# Patient Record
Sex: FEMALE | Race: WHITE | NOT HISPANIC OR LATINO | Employment: UNEMPLOYED | URBAN - METROPOLITAN AREA
[De-identification: names, ages, dates, MRNs, and addresses within clinical notes are randomized per-mention and may not be internally consistent; named-entity substitution may affect disease eponyms.]

---

## 2017-03-16 ENCOUNTER — OFFICE VISIT (OUTPATIENT)
Dept: OPTOMETRY | Facility: CLINIC | Age: 62
End: 2017-03-16
Payer: COMMERCIAL

## 2017-03-16 DIAGNOSIS — H04.123 DRY EYE SYNDROME, BILATERAL: ICD-10-CM

## 2017-03-16 DIAGNOSIS — H52.7 REFRACTIVE ERROR: ICD-10-CM

## 2017-03-16 DIAGNOSIS — R51.9 HEADACHE AROUND THE EYES: Primary | ICD-10-CM

## 2017-03-16 PROCEDURE — 92012 INTRM OPH EXAM EST PATIENT: CPT | Mod: S$GLB,,, | Performed by: OPTOMETRIST

## 2017-03-16 PROCEDURE — 99999 PR PBB SHADOW E&M-EST. PATIENT-LVL I: CPT | Mod: PBBFAC,,, | Performed by: OPTOMETRIST

## 2017-03-16 NOTE — PROGRESS NOTES
HPI     CC: Pt here for blurred vision in right eye over the last 2 weeks. Pt is   getting eye strain from blurred vision and causing headaches. Pt states 2   weeks right eye was irritated and has been blurry since. Pt last wore   contacts 2 weeks ago. Pt does not sleeps in lenses. Pt denies crust or   tears in right eye. Pt states when she blink right eye does feel dry.    (+) OTC art tears using 1 x day both eyes     (-) pain / (+) discomfort  (+) headache behind eye  (-) diplopia   (-) flashes / (-) floaters         Last edited by Leonardo Stephen, OD on 3/16/2017  9:56 AM.     ROS     Positive for: Eyes    Negative for: Constitutional, Gastrointestinal, Neurological, Skin,   Genitourinary, Musculoskeletal, HENT, Endocrine, Cardiovascular,   Respiratory, Psychiatric, Allergic/Imm, Heme/Lymph    Last edited by Leonardo Stephen, OD on 3/16/2017  9:18 AM. (History)        Assessment /Plan     For exam results, see Encounter Report.    Headache around the eyes    Refractive error    Dry eye syndrome, bilateral      HA around eye, pt reports feels better when lying down. Not taking any meds. Pt did not update specs from last visit. Demonstrated updated Rx vs current specs in phoropter with pt satisfaction. Recommend updating new specs and otc sudafed as directed for possible sinus congestion. If any worsening of symptoms or no improvement, return for f/u.     Discussed daily disposable contacts in future. Pt only wears once every 2 weeks.     JOE OU. Recommend otc systane gel drop bid ou. Transient blurring of vision with gel use. Return if any worsening of symptoms or no alleviation with otc drops.    RTC as scheduled for comprehensive eye exam, or sooner prn.

## 2017-07-10 ENCOUNTER — HOSPITAL ENCOUNTER (OUTPATIENT)
Dept: RADIOLOGY | Facility: HOSPITAL | Age: 62
Discharge: HOME | End: 2017-07-10
Attending: ORTHOPAEDIC SURGERY
Payer: COMMERCIAL

## 2017-07-10 ENCOUNTER — OFFICE VISIT (OUTPATIENT)
Dept: ORTHOPEDICS | Facility: CLINIC | Age: 62
End: 2017-07-10
Payer: COMMERCIAL

## 2017-07-10 VITALS
HEART RATE: 68 BPM | WEIGHT: 145 LBS | HEIGHT: 62 IN | SYSTOLIC BLOOD PRESSURE: 162 MMHG | DIASTOLIC BLOOD PRESSURE: 85 MMHG | BODY MASS INDEX: 26.68 KG/M2

## 2017-07-10 DIAGNOSIS — S83.241A ACUTE MEDIAL MENISCAL TEAR, RIGHT, INITIAL ENCOUNTER: Primary | ICD-10-CM

## 2017-07-10 DIAGNOSIS — M25.569 KNEE PAIN, UNSPECIFIED CHRONICITY, UNSPECIFIED LATERALITY: Primary | ICD-10-CM

## 2017-07-10 DIAGNOSIS — M25.569 KNEE PAIN, UNSPECIFIED CHRONICITY, UNSPECIFIED LATERALITY: ICD-10-CM

## 2017-07-10 PROCEDURE — 73564 X-RAY EXAM KNEE 4 OR MORE: CPT | Mod: TC,PN,RT

## 2017-07-10 PROCEDURE — 20610 DRAIN/INJ JOINT/BURSA W/O US: CPT | Mod: RT,S$GLB,, | Performed by: ORTHOPAEDIC SURGERY

## 2017-07-10 PROCEDURE — 99999 PR PBB SHADOW E&M-EST. PATIENT-LVL III: CPT | Mod: PBBFAC,,, | Performed by: ORTHOPAEDIC SURGERY

## 2017-07-10 PROCEDURE — 73564 X-RAY EXAM KNEE 4 OR MORE: CPT | Mod: 26,RT,, | Performed by: RADIOLOGY

## 2017-07-10 PROCEDURE — 73562 X-RAY EXAM OF KNEE 3: CPT | Mod: 26,59,LT, | Performed by: RADIOLOGY

## 2017-07-10 PROCEDURE — 99203 OFFICE O/P NEW LOW 30 MIN: CPT | Mod: 25,S$GLB,, | Performed by: ORTHOPAEDIC SURGERY

## 2017-07-10 RX ORDER — TRIAMCINOLONE ACETONIDE 40 MG/ML
40 INJECTION, SUSPENSION INTRA-ARTICULAR; INTRAMUSCULAR
Status: DISCONTINUED | OUTPATIENT
Start: 2017-07-10 | End: 2017-07-10 | Stop reason: HOSPADM

## 2017-07-10 RX ADMIN — TRIAMCINOLONE ACETONIDE 40 MG: 40 INJECTION, SUSPENSION INTRA-ARTICULAR; INTRAMUSCULAR at 04:07

## 2017-07-10 NOTE — PROCEDURES
Large Joint Aspiration/Injection  Date/Time: 7/10/2017 4:00 PM  Performed by: YULISSA DODGE  Authorized by: YULISSA DODGE     Consent Done?:  Yes (Verbal)  Indications:  Pain  Procedure site marked: Yes    Timeout: Prior to procedure the correct patient, procedure, and site was verified      Location:  Knee  Site:  R knee  Prep: Patient was prepped and draped in usual sterile fashion    Needle size:  20 G  Approach:  Anterolateral  Medications:  40 mg triamcinolone acetonide 40 mg/mL  Patient tolerance:  Patient tolerated the procedure well with no immediate complications

## 2017-07-10 NOTE — PROGRESS NOTES
History reviewed. No pertinent past medical history.    History reviewed. No pertinent surgical history.    No current outpatient prescriptions on file.     No current facility-administered medications for this visit.        Review of patient's allergies indicates:  No Known Allergies    Family History   Problem Relation Age of Onset    Glaucoma Neg Hx     Macular degeneration Neg Hx     Retinal detachment Neg Hx        Social History     Social History    Marital status:      Spouse name: N/A    Number of children: N/A    Years of education: N/A     Occupational History    Not on file.     Social History Main Topics    Smoking status: Current Some Day Smoker    Smokeless tobacco: Never Used    Alcohol use Yes    Drug use: No    Sexual activity: Not on file     Other Topics Concern    Not on file     Social History Narrative    No narrative on file       Chief Complaint:   Chief Complaint   Patient presents with    Knee Pain     right knee pain       History of present illness: 61-year-old female seen for right knee pain.  Patient injured her knee on July 6, 2017.  She thinks that she hurt it getting up off the floor with her grandchild.  Patient has pain when bending and twisting.  Pain is medially located.  Symptoms are worsening and moderate to severe.  Pain as a 6 out of 10 but can be up to a 10 out of 10 at times.      Review of Systems:    Constitution: Negative for chills, fever, and sweats.  Negative for unexplained weight loss.    HENT:  Negative for headaches and blurry vision.    Cardiovascular:Negative for chest pain or irregular heart beat. Negative for hypertension.    Respiratory:  Negative for cough and shortness of breath.    Gastrointestinal: Negative for abdominal pain, heartburn, melena, nausea, and vomitting.    Genitourinary:  Negative bladder incontinence and dysuria.    Musculoskeletal:  See HPI    Neurological: Negative for numbness.    Psychiatric/Behavioral: Negative  for depression.  The patient is not nervous/anxious.      Endocrine: Negative for polyuria    Hematologic/Lymphatic: Negative for bleeding problem.  Does not bruise/bleed easily.    Skin: Negative for poor would healing and rash      Physical Examination:    Vital Signs:    Vitals:    07/10/17 1351   BP: (!) 162/85   Pulse: 68       Body mass index is 26.52 kg/m².    This a well-developed, well nourished patient in no acute distress.  They are alert and oriented and cooperative to examination.  Pt. walks without an antalgic gait.      Examination of the right knee shows no rashes or erythema. There are no masses ecchymosis or effusion. Patient has full range of motion from 0-130°. Patient is nontender to palpation over lateral joint line and markedly tender to palpation over the medial joint line. Patient has a - Lachman exam, - anterior drawer exam, and - posterior drawer exam.  Positive medial Apley exam. Knee is stable to varus and valgus stress. 5 out of 5 motor strength. Palpable distal pulses. Intact light touch sensation. Negative Patellofemoral crepitus    Examination of the left knee shows no rashes or erythema. There are no masses ecchymosis or effusion. Patient has full range of motion from 0-130°. Patient is nontender to palpation over lateral joint line and nontender to palpation over the medial joint line. Patient has a - Lachman exam, - anterior drawer exam, and - posterior drawer exam. - Marc's exam. Knee is stable to varus and valgus stress. 5 out of 5 motor strength. Palpable distal pulses. Intact light touch sensation. Negative Patellofemoral crepitus    X-rays: X-rays of the right knee are ordered and reviewed which show mild joint effusion.  Very mild medial narrowing bilaterally     Assessment:: Right medial meniscal tear with some mild underlying arthritis    Plan:  I reviewed the diagnosis and the x-rays with her today.  We talked about meniscal tears.  Patient elected to try cortisone  injection.  We talked about possibly getting an MRI if the pain does not improve with the injection and anti-inflammatories.    This note was created using Dragon voice recognition software that occasionally misinterpreted phrases or words.    Consult note is delivered via Epic messaging service.

## 2018-02-27 ENCOUNTER — OFFICE VISIT (OUTPATIENT)
Dept: FAMILY MEDICINE | Facility: CLINIC | Age: 63
End: 2018-02-27
Payer: COMMERCIAL

## 2018-02-27 VITALS
SYSTOLIC BLOOD PRESSURE: 132 MMHG | HEIGHT: 62 IN | DIASTOLIC BLOOD PRESSURE: 72 MMHG | BODY MASS INDEX: 25.6 KG/M2 | OXYGEN SATURATION: 98 % | HEART RATE: 81 BPM | WEIGHT: 139.13 LBS

## 2018-02-27 DIAGNOSIS — Z00.00 WELLNESS EXAMINATION: ICD-10-CM

## 2018-02-27 DIAGNOSIS — Z72.0 TOBACCO USE: ICD-10-CM

## 2018-02-27 DIAGNOSIS — Z76.89 ESTABLISHING CARE WITH NEW DOCTOR, ENCOUNTER FOR: Primary | ICD-10-CM

## 2018-02-27 DIAGNOSIS — Z11.59 ENCOUNTER FOR HEPATITIS C SCREENING TEST FOR LOW RISK PATIENT: ICD-10-CM

## 2018-02-27 DIAGNOSIS — Z12.11 COLON CANCER SCREENING: ICD-10-CM

## 2018-02-27 DIAGNOSIS — Z12.39 ENCOUNTER FOR SCREENING FOR MALIGNANT NEOPLASM OF BREAST: ICD-10-CM

## 2018-02-27 PROCEDURE — 99999 PR PBB SHADOW E&M-EST. PATIENT-LVL III: CPT | Mod: PBBFAC,,, | Performed by: FAMILY MEDICINE

## 2018-02-27 PROCEDURE — 99386 PREV VISIT NEW AGE 40-64: CPT | Mod: S$GLB,,, | Performed by: FAMILY MEDICINE

## 2018-02-27 NOTE — PROGRESS NOTES
Subjective:       Patient ID: Laura Patino is a 62 y.o. female.    Chief Complaint: Annual Exam    HPI     Annual Exam  Pt reports to the clinic for a wellness exam.   Currently, pt is without complaint.   The pt has a medical history which includes tobacco use.   As far as smoking is concerned, the pt reports that she smokes.   The pt attempts to maintain a healthy diet and pt reports that she is active but she does not routinely engage in regular exercise at this point but plans to soon.  Consistent seatbelt usage reported.   Pt has no symptoms of depression.     Review of Systems   Constitutional: Negative for chills and fever.   HENT: Negative for sore throat.    Eyes: Negative for visual disturbance.   Respiratory: Negative for cough and shortness of breath.    Cardiovascular: Negative for chest pain and leg swelling.   Gastrointestinal: Negative for abdominal pain, blood in stool, constipation, diarrhea and vomiting.   Genitourinary: Negative for difficulty urinating, dysuria and hematuria.   Musculoskeletal: Negative for arthralgias and back pain.   Neurological: Negative for weakness and headaches.       Objective:      Physical Exam   Constitutional: She appears well-developed and well-nourished. No distress.   HENT:   Head: Normocephalic and atraumatic.   Mouth/Throat: Oropharynx is clear and moist. No oropharyngeal exudate.   Eyes: EOM are normal. Pupils are equal, round, and reactive to light.   Neck: Normal range of motion. Neck supple. No thyromegaly present.   Cardiovascular: Normal rate, regular rhythm, normal heart sounds and intact distal pulses.    Pulmonary/Chest: Effort normal and breath sounds normal. No respiratory distress. She has no wheezes.   Abdominal: Soft. Bowel sounds are normal. She exhibits no distension and no mass. There is no tenderness.   Musculoskeletal: She exhibits no edema.   Lymphadenopathy:     She has no cervical adenopathy.   Neurological: She is alert.   Skin: Skin  is warm. No rash noted. No erythema.   Psychiatric: She has a normal mood and affect. Her behavior is normal.   Vitals reviewed.      Assessment:       1. Establishing care with new doctor, encounter for    2. Wellness examination    3. Tobacco use    4. Encounter for screening for malignant neoplasm of breast    5. Encounter for hepatitis C screening test for low risk patient        Plan:       1. Establishing care with new doctor, encounter for    2. Wellness examination  Patient has been advised to continue to maintain a healthy lifestyle, including regular exercise and consuming a well balanced diet.   - Lipid panel; Future  - Comprehensive metabolic panel; Future  - Microalbumin/creatinine urine ratio  - TSH    3. Tobacco use  - Ambulatory referral to Smoking Cessation Program    4. Encounter for screening for malignant neoplasm of breast  - Mammo Digital Screening Bilat with CAD; Future    5. Encounter for hepatitis C screening test for low risk patient  - Hepatitis C antibody; Future    6. Colon cancer screening  - Ambulatory referral to Gastroenterology    Portions of this note were created using Dragon voice recognition software. There may be voice recognition errors found in the text, and attempts were made to correct these errors prior to signature    Ruddy Morales MD    Family Medicine  2/27/2018

## 2018-02-28 ENCOUNTER — LAB VISIT (OUTPATIENT)
Dept: LAB | Facility: HOSPITAL | Age: 63
End: 2018-02-28
Attending: FAMILY MEDICINE
Payer: COMMERCIAL

## 2018-02-28 ENCOUNTER — HOSPITAL ENCOUNTER (OUTPATIENT)
Dept: RADIOLOGY | Facility: CLINIC | Age: 63
Discharge: HOME | End: 2018-02-28
Attending: FAMILY MEDICINE
Payer: COMMERCIAL

## 2018-02-28 DIAGNOSIS — Z00.00 WELLNESS EXAMINATION: ICD-10-CM

## 2018-02-28 DIAGNOSIS — Z11.59 ENCOUNTER FOR HEPATITIS C SCREENING TEST FOR LOW RISK PATIENT: ICD-10-CM

## 2018-02-28 DIAGNOSIS — Z12.39 ENCOUNTER FOR SCREENING FOR MALIGNANT NEOPLASM OF BREAST: ICD-10-CM

## 2018-02-28 LAB
ALBUMIN SERPL BCP-MCNC: 3.6 G/DL
ALP SERPL-CCNC: 69 U/L
ALT SERPL W/O P-5'-P-CCNC: 16 U/L
ANION GAP SERPL CALC-SCNC: 9 MMOL/L
AST SERPL-CCNC: 17 U/L
BILIRUB SERPL-MCNC: 0.3 MG/DL
BUN SERPL-MCNC: 16 MG/DL
CALCIUM SERPL-MCNC: 9.6 MG/DL
CHLORIDE SERPL-SCNC: 105 MMOL/L
CHOLEST SERPL-MCNC: 190 MG/DL
CHOLEST/HDLC SERPL: 3.3 {RATIO}
CO2 SERPL-SCNC: 27 MMOL/L
CREAT SERPL-MCNC: 0.7 MG/DL
EST. GFR  (AFRICAN AMERICAN): >60 ML/MIN/1.73 M^2
EST. GFR  (NON AFRICAN AMERICAN): >60 ML/MIN/1.73 M^2
GLUCOSE SERPL-MCNC: 89 MG/DL
HDLC SERPL-MCNC: 58 MG/DL
HDLC SERPL: 30.5 %
LDLC SERPL CALC-MCNC: 116.8 MG/DL
NONHDLC SERPL-MCNC: 132 MG/DL
POTASSIUM SERPL-SCNC: 4.2 MMOL/L
PROT SERPL-MCNC: 7 G/DL
SODIUM SERPL-SCNC: 141 MMOL/L
TRIGL SERPL-MCNC: 76 MG/DL
TSH SERPL DL<=0.005 MIU/L-ACNC: 0.85 UIU/ML

## 2018-02-28 PROCEDURE — 77067 SCR MAMMO BI INCL CAD: CPT | Mod: 26,,, | Performed by: RADIOLOGY

## 2018-02-28 PROCEDURE — 77063 BREAST TOMOSYNTHESIS BI: CPT | Mod: 26,,, | Performed by: RADIOLOGY

## 2018-02-28 PROCEDURE — 80053 COMPREHEN METABOLIC PANEL: CPT

## 2018-02-28 PROCEDURE — 84443 ASSAY THYROID STIM HORMONE: CPT

## 2018-02-28 PROCEDURE — 36415 COLL VENOUS BLD VENIPUNCTURE: CPT | Mod: PO

## 2018-02-28 PROCEDURE — 80061 LIPID PANEL: CPT

## 2018-02-28 PROCEDURE — 86803 HEPATITIS C AB TEST: CPT

## 2018-02-28 PROCEDURE — 77067 SCR MAMMO BI INCL CAD: CPT | Mod: TC,PO

## 2018-03-01 DIAGNOSIS — Z12.11 COLON CANCER SCREENING: ICD-10-CM

## 2018-03-01 LAB — HCV AB SERPL QL IA: NEGATIVE

## 2018-04-18 ENCOUNTER — OFFICE VISIT (OUTPATIENT)
Dept: OPTOMETRY | Facility: CLINIC | Age: 63
End: 2018-04-18
Payer: COMMERCIAL

## 2018-04-18 DIAGNOSIS — H52.7 REFRACTIVE ERROR: ICD-10-CM

## 2018-04-18 DIAGNOSIS — Z46.0 CONTACT LENS/GLASSES FITTING: Primary | ICD-10-CM

## 2018-04-18 DIAGNOSIS — H25.13 NUCLEAR SCLEROSIS, BILATERAL: Primary | ICD-10-CM

## 2018-04-18 PROCEDURE — 99999 PR PBB SHADOW E&M-EST. PATIENT-LVL I: CPT | Mod: PBBFAC,,, | Performed by: OPTOMETRIST

## 2018-04-18 PROCEDURE — 99499 UNLISTED E&M SERVICE: CPT | Mod: ,,, | Performed by: OPTOMETRIST

## 2018-04-18 PROCEDURE — 92014 COMPRE OPH EXAM EST PT 1/>: CPT | Mod: S$GLB,,, | Performed by: OPTOMETRIST

## 2018-04-18 PROCEDURE — 99999 PR PBB SHADOW E&M-EST. PATIENT-LVL II: CPT | Mod: PBBFAC,,, | Performed by: OPTOMETRIST

## 2018-04-18 PROCEDURE — 92310 CONTACT LENS FITTING OU: CPT | Mod: ,,, | Performed by: OPTOMETRIST

## 2018-04-18 PROCEDURE — 92015 DETERMINE REFRACTIVE STATE: CPT | Mod: S$GLB,,, | Performed by: OPTOMETRIST

## 2018-04-18 NOTE — PROGRESS NOTES
Assessment /Plan     For exam results, see Encounter Report.    Contact lens/glasses fitting      Patient is here for a comprehensive eye exam and contact lens fit. See other exam visit with same encounter date 04/18/18 for detailed exam information.

## 2018-04-18 NOTE — PROGRESS NOTES
HPI     Presenting Complaint: Pt here today for yearly ocular health check and   contact lens fitting.    CL Brand:   Right [employer] Ultra 8.5 14.2 +1.75 Sphere Daily Wear  Left B[employer]ch [employer] Lomb Ultra 8.5 14.2 +4.50 Sphere Daily Wear    Sleeps in lenses: Never  Changes lenses:  Every 2 weeks  Solution: Multipurpose     Pt states vision was good but contacts do see to cause irration in both   eyes, Pt states eyes have been turing red once contact lens are in eye    Ophthalmic medication / drops: None    (-) headaches  (-) diplopia   (-) flashes / (-) floaters      Last edited by Leonardo Stephen, OD on 4/18/2018 10:11 AM. (History)            Assessment /Plan     For exam results, see Encounter Report.    Nuclear sclerosis, bilateral    Refractive error      Mild NS OU. Discussed possible ocular affects of cataracts. Acceptable BCVA OU. Discussed treatment options. Surgery not recommended at this time. Monitor yearly.     Dispensed updated spectacle Rx. Discussed various spectacle lens options. Discussed adaptation period to new specs.     Discussed CLs options. Switched to daily disposable for occasional wear. Dispensed CLs trials for monovision (OD distance  OS near): BioTrue. Discussed proper wear and care of lenses. DW only, dispose of daily. Do not sleep/swim/shower in lenses. Discontinue CL wear ASAP and RTC if any redness or discomfort occurs. Return in 1 week for CLFU.      RTC in 1 week for CLFU, or sooner prn.

## 2018-04-25 ENCOUNTER — OFFICE VISIT (OUTPATIENT)
Dept: OPTOMETRY | Facility: CLINIC | Age: 63
End: 2018-04-25
Payer: COMMERCIAL

## 2018-04-25 DIAGNOSIS — Z46.0 CONTACT LENS/GLASSES FITTING: Primary | ICD-10-CM

## 2018-04-25 PROCEDURE — 99499 UNLISTED E&M SERVICE: CPT | Mod: S$GLB,,, | Performed by: OPTOMETRIST

## 2018-04-25 NOTE — PROGRESS NOTES
HPI     Presenting Complaint: Pt here today for contact lens follow up. Pt happy   with comfort of daily lens. Pt sates mono vision is going well. Still   getting used to driving with them at night. Pt states near vision has been   great.         Last edited by Leonardo Stephen, OD on 4/25/2018  9:56 AM. (History)            Assessment /Plan     For exam results, see Encounter Report.    Contact lens/glasses fitting      Good CLFU. Pt happy with comfort. Pt overall happy with vision. No irritation, happier with lenses than previous. Dispensed CLs Rx: BioTrue OneDay. Discussed proper wear and care of lenses. DW only, dispose of daily. Do not sleep/swim/shower in lenses. Discontinue CL wear ASAP and RTC if any redness or discomfort occurs.

## 2019-06-21 ENCOUNTER — PATIENT OUTREACH (OUTPATIENT)
Dept: ADMINISTRATIVE | Facility: HOSPITAL | Age: 64
End: 2019-06-21

## 2019-06-21 NOTE — PROGRESS NOTES
Health Maintenance Due   Topic Date Due    TETANUS VACCINE  10/17/1975    Pneumococcal Vaccine (Medium Risk) (1 of 1 - PPSV23) 10/17/1976    Colonoscopy  10/17/2007

## 2019-06-21 NOTE — LETTER
June 21, 2019    Laura Patino  [address]  [place] [address]             [facility]  [address]  [facility] [address]  Phone: [phone] Dear Laura Patino,    [facility] is committed to your overall health.  To help you get the most out of each of your visits, we will review your information to make sure you are up to date on all of your recommended tests and/or procedures.      As a new patient to Dr.Amy Caesar MD , we may not have your complete medical records. She has found that your chart shows you may be due for a:    COLORECTAL CANCER SCREENING      If you have had any of the above done at another facility, please bring the records or information with you so that your record at [facility] will be complete.      If you are currently taking medication, please bring it with you to your appointment for review.    Also, if you have any type of Advanced Directives, please bring them with you to your office visit so we may scan them into your chart.      Thank You,  Your [facility] Team  Dr Tanika Brady LPN Clinical Care Coordinator  [facility] [address] [place] [address]  Phone [phone]  Fax [phone]

## 2019-07-05 ENCOUNTER — OFFICE VISIT (OUTPATIENT)
Dept: FAMILY MEDICINE | Facility: CLINIC | Age: 64
End: 2019-07-05
Payer: COMMERCIAL

## 2019-07-05 VITALS
TEMPERATURE: 98 F | HEART RATE: 68 BPM | SYSTOLIC BLOOD PRESSURE: 120 MMHG | RESPIRATION RATE: 12 BRPM | DIASTOLIC BLOOD PRESSURE: 70 MMHG | HEIGHT: 62 IN | BODY MASS INDEX: 26.57 KG/M2 | OXYGEN SATURATION: 98 % | WEIGHT: 144.38 LBS

## 2019-07-05 DIAGNOSIS — Z13.220 LIPID SCREENING: ICD-10-CM

## 2019-07-05 DIAGNOSIS — Z13.29 THYROID DISORDER SCREENING: ICD-10-CM

## 2019-07-05 DIAGNOSIS — Z12.31 ENCOUNTER FOR SCREENING MAMMOGRAM FOR MALIGNANT NEOPLASM OF BREAST: ICD-10-CM

## 2019-07-05 DIAGNOSIS — F41.8 DEPRESSION WITH ANXIETY: ICD-10-CM

## 2019-07-05 DIAGNOSIS — Z23 NEED FOR SHINGLES VACCINE: ICD-10-CM

## 2019-07-05 DIAGNOSIS — Z72.0 TOBACCO USE: ICD-10-CM

## 2019-07-05 DIAGNOSIS — Z23 NEED FOR TDAP VACCINATION: ICD-10-CM

## 2019-07-05 DIAGNOSIS — Z00.00 ANNUAL PHYSICAL EXAM: Primary | ICD-10-CM

## 2019-07-05 DIAGNOSIS — Z23 NEED FOR 23-POLYVALENT PNEUMOCOCCAL POLYSACCHARIDE VACCINE: ICD-10-CM

## 2019-07-05 PROCEDURE — 99999 PR PBB SHADOW E&M-EST. PATIENT-LVL IV: CPT | Mod: PBBFAC,,, | Performed by: FAMILY MEDICINE

## 2019-07-05 PROCEDURE — 90715 TDAP VACCINE GREATER THAN OR EQUAL TO 7YO IM: ICD-10-PCS | Mod: S$GLB,,, | Performed by: FAMILY MEDICINE

## 2019-07-05 PROCEDURE — 90750 HZV VACC RECOMBINANT IM: CPT | Mod: S$GLB,,, | Performed by: FAMILY MEDICINE

## 2019-07-05 PROCEDURE — 90471 TDAP VACCINE GREATER THAN OR EQUAL TO 7YO IM: ICD-10-PCS | Mod: S$GLB,,, | Performed by: FAMILY MEDICINE

## 2019-07-05 PROCEDURE — 99396 PR PREVENTIVE VISIT,EST,40-64: ICD-10-PCS | Mod: S$GLB,,, | Performed by: FAMILY MEDICINE

## 2019-07-05 PROCEDURE — 99396 PREV VISIT EST AGE 40-64: CPT | Mod: S$GLB,,, | Performed by: FAMILY MEDICINE

## 2019-07-05 PROCEDURE — 90472 ZOSTER RECOMBINANT VACCINE: ICD-10-PCS | Mod: S$GLB,,, | Performed by: FAMILY MEDICINE

## 2019-07-05 PROCEDURE — 90471 IMMUNIZATION ADMIN: CPT | Mod: S$GLB,,, | Performed by: FAMILY MEDICINE

## 2019-07-05 PROCEDURE — 90715 TDAP VACCINE 7 YRS/> IM: CPT | Mod: S$GLB,,, | Performed by: FAMILY MEDICINE

## 2019-07-05 PROCEDURE — 90732 PNEUMOCOCCAL POLYSACCHARIDE VACCINE 23-VALENT =>2YO SQ IM: ICD-10-PCS | Mod: S$GLB,,, | Performed by: FAMILY MEDICINE

## 2019-07-05 PROCEDURE — 90472 IMMUNIZATION ADMIN EACH ADD: CPT | Mod: S$GLB,,, | Performed by: FAMILY MEDICINE

## 2019-07-05 PROCEDURE — 90750 ZOSTER RECOMBINANT VACCINE: ICD-10-PCS | Mod: S$GLB,,, | Performed by: FAMILY MEDICINE

## 2019-07-05 PROCEDURE — 90732 PPSV23 VACC 2 YRS+ SUBQ/IM: CPT | Mod: S$GLB,,, | Performed by: FAMILY MEDICINE

## 2019-07-05 PROCEDURE — 99999 PR PBB SHADOW E&M-EST. PATIENT-LVL IV: ICD-10-PCS | Mod: PBBFAC,,, | Performed by: FAMILY MEDICINE

## 2019-07-05 RX ORDER — CHOLECALCIFEROL (VITAMIN D3) 25 MCG
1000 TABLET ORAL DAILY
COMMUNITY
End: 2019-10-28

## 2019-07-05 RX ORDER — ESCITALOPRAM OXALATE 10 MG/1
10 TABLET ORAL DAILY
Qty: 30 TABLET | Refills: 5 | Status: SHIPPED | OUTPATIENT
Start: 2019-07-05 | End: 2019-10-28

## 2019-07-05 RX ORDER — PNV NO.95/FERROUS FUM/FOLIC AC 28MG-0.8MG
100 TABLET ORAL DAILY
COMMUNITY

## 2019-07-05 NOTE — PROGRESS NOTES
Subjective:       Patient ID: Laura Patino is a 63 y.o. female.    Chief Complaint: Establish Care    HPI  Review of Systems   Constitutional: Negative for activity change and unexpected weight change.   HENT: Negative for hearing loss, rhinorrhea and trouble swallowing.    Eyes: Negative for discharge and visual disturbance.   Respiratory: Negative for chest tightness and wheezing.    Cardiovascular: Negative for chest pain and palpitations.   Gastrointestinal: Negative for blood in stool, constipation, diarrhea and vomiting.   Endocrine: Negative for polydipsia and polyuria.   Genitourinary: Negative for difficulty urinating, dysuria, hematuria and menstrual problem.   Musculoskeletal: Negative for arthralgias, joint swelling and neck pain.   Neurological: Negative for weakness and headaches.   Psychiatric/Behavioral: Positive for dysphoric mood and sleep disturbance. Negative for confusion. The patient is nervous/anxious.        Patient Active Problem List   Diagnosis    Tobacco use     Patient is here .    Will consider colonoscopy versus FIT  Objective:      Physical Exam   Constitutional: She is oriented to person, place, and time. She appears well-developed and well-nourished.   Cardiovascular: Normal rate, regular rhythm and normal heart sounds.   Pulmonary/Chest: Effort normal and breath sounds normal.   Musculoskeletal: She exhibits no edema.   Neurological: She is alert and oriented to person, place, and time.   Skin: Skin is warm and dry.   Psychiatric: Her speech is normal and behavior is normal. Her mood appears anxious. She exhibits a depressed mood.   Nursing note and vitals reviewed.      Assessment:       1. Annual physical exam    2. Lipid screening    3. Thyroid disorder screening    4. Tobacco use    5. Need for Tdap vaccination    6. Need for shingles vaccine    7. Need for 23-polyvalent pneumococcal polysaccharide vaccine    8. Encounter for screening mammogram for malignant neoplasm of  breast    9. Depression with anxiety        Plan:         1. Annual physical exam  Discussed health maintenance guidelines appropriate for age.      - CBC auto differential; Future  - Comprehensive metabolic panel; Future    2. Lipid screening  Screen and treat as indicated:    - Lipid panel; Future    3. Thyroid disorder screening  Screen and treat as indicated:    - TSH; Future    4. Tobacco use  Patient counseled on smoking cessation. I discussed options such as nicotine replacement products, wellbutrin, and chantix.  Side effects, benefits and risks were discussed regarding each.  Printed materials were given.  I offered a referral to [facility] smoking cessation program.  All questions were answered.      5. Need for Tdap vaccination  Immunize today.  Counseled patient on risks, benefits and side effects.  Patient elected to proceed with vaccination.    - (In Office Administered) Tdap Vaccine    6. Need for shingles vaccine  Immunize today.  Counseled patient on risks, benefits and side effects.  Patient elected to proceed with vaccination.    - (In Office Administered) Zoster Recombinant Vaccine  - (In Office Administered) Zoster Recombinant Vaccine; Future    7. Need for 23-polyvalent pneumococcal polysaccharide vaccine  Immunize today.  Counseled patient on risks, benefits and side effects.  Patient elected to proceed with vaccination.    - (In Office Administered) Pneumococcal Polysaccharide Vaccine (23 Valent) (SQ/IM)    8. Encounter for screening mammogram for malignant neoplasm of breast  Screen and treat as indicated:      9. Depression with anxiety  Treat  - escitalopram oxalate (LEXAPRO) 10 MG tablet; Take 1 tablet (10 mg total) by mouth once daily.  Dispense: 30 tablet; Refill: 5        Time spent with patient: 20 minutes    Patient with be reevaluated in 5 weeks and 6 months or sooner prn    Greater than 50% of this visit was spent counseling as described in above documentation:Yes

## 2019-07-05 NOTE — PROGRESS NOTES
Patient verified by name and . Patient received tdap in left deltoid, pneumococcal 23 in right deltoid and Shingrix 1 of 2 in right ventrogluteal. Patient tolerated injections well. Patient advised to wait in clinic for 15 minutes in case of adverse reactions. Patient demonstrated understanding.

## 2019-07-07 ENCOUNTER — PATIENT MESSAGE (OUTPATIENT)
Dept: FAMILY MEDICINE | Facility: CLINIC | Age: 64
End: 2019-07-07

## 2019-07-08 ENCOUNTER — HOSPITAL ENCOUNTER (OUTPATIENT)
Dept: RADIOLOGY | Facility: CLINIC | Age: 64
Discharge: HOME | End: 2019-07-08
Attending: FAMILY MEDICINE
Payer: COMMERCIAL

## 2019-07-08 DIAGNOSIS — Z12.31 ENCOUNTER FOR SCREENING MAMMOGRAM FOR MALIGNANT NEOPLASM OF BREAST: ICD-10-CM

## 2019-07-08 PROCEDURE — 77067 SCR MAMMO BI INCL CAD: CPT | Mod: TC,PO

## 2019-07-08 PROCEDURE — 77067 SCR MAMMO BI INCL CAD: CPT | Mod: 26,,, | Performed by: RADIOLOGY

## 2019-07-08 PROCEDURE — 77063 BREAST TOMOSYNTHESIS BI: CPT | Mod: 26,,, | Performed by: RADIOLOGY

## 2019-07-08 PROCEDURE — 77067 MAMMO DIGITAL SCREENING BILAT WITH TOMOSYNTHESIS_CAD: ICD-10-PCS | Mod: 26,,, | Performed by: RADIOLOGY

## 2019-07-08 PROCEDURE — 77063 MAMMO DIGITAL SCREENING BILAT WITH TOMOSYNTHESIS_CAD: ICD-10-PCS | Mod: 26,,, | Performed by: RADIOLOGY

## 2019-07-30 ENCOUNTER — PATIENT MESSAGE (OUTPATIENT)
Dept: FAMILY MEDICINE | Facility: CLINIC | Age: 64
End: 2019-07-30

## 2019-08-28 ENCOUNTER — PATIENT MESSAGE (OUTPATIENT)
Dept: FAMILY MEDICINE | Facility: CLINIC | Age: 64
End: 2019-08-28

## 2019-09-13 ENCOUNTER — CLINICAL SUPPORT (OUTPATIENT)
Dept: FAMILY MEDICINE | Facility: CLINIC | Age: 64
End: 2019-09-13
Payer: COMMERCIAL

## 2019-09-13 DIAGNOSIS — Z23 NEED FOR SHINGLES VACCINE: ICD-10-CM

## 2019-09-13 PROCEDURE — 90471 ZOSTER RECOMBINANT VACCINE: ICD-10-PCS | Mod: S$GLB,,, | Performed by: FAMILY MEDICINE

## 2019-09-13 PROCEDURE — 90471 IMMUNIZATION ADMIN: CPT | Mod: S$GLB,,, | Performed by: FAMILY MEDICINE

## 2019-09-13 PROCEDURE — 90750 HZV VACC RECOMBINANT IM: CPT | Mod: S$GLB,,, | Performed by: FAMILY MEDICINE

## 2019-09-13 PROCEDURE — 90750 ZOSTER RECOMBINANT VACCINE: ICD-10-PCS | Mod: S$GLB,,, | Performed by: FAMILY MEDICINE

## 2019-09-13 NOTE — PROGRESS NOTES
Patient verified by name and . Patient received Shingrix 2 of 2 in right ventrogluteal. Patient tolerated injection well. Patient advised to wait in clinic for 15 minutes in case of adverse reactions. Patient demonstrated understanding.

## 2019-10-28 ENCOUNTER — NURSE TRIAGE (OUTPATIENT)
Dept: ADMINISTRATIVE | Facility: CLINIC | Age: 64
End: 2019-10-28

## 2019-10-28 ENCOUNTER — PATIENT MESSAGE (OUTPATIENT)
Dept: FAMILY MEDICINE | Facility: CLINIC | Age: 64
End: 2019-10-28

## 2019-10-28 ENCOUNTER — HOSPITAL ENCOUNTER (OUTPATIENT)
Facility: HOSPITAL | Age: 64
Discharge: HOME | End: 2019-10-29
Attending: EMERGENCY MEDICINE | Admitting: FAMILY MEDICINE
Payer: COMMERCIAL

## 2019-10-28 ENCOUNTER — CLINICAL SUPPORT (OUTPATIENT)
Dept: CARDIOLOGY | Facility: HOSPITAL | Age: 64
End: 2019-10-28
Attending: FAMILY MEDICINE
Payer: COMMERCIAL

## 2019-10-28 VITALS — WEIGHT: 140 LBS | BODY MASS INDEX: 25.76 KG/M2 | HEIGHT: 62 IN

## 2019-10-28 DIAGNOSIS — G45.9 TIA (TRANSIENT ISCHEMIC ATTACK): ICD-10-CM

## 2019-10-28 DIAGNOSIS — R42 DIZZINESS: Primary | ICD-10-CM

## 2019-10-28 PROBLEM — Z78.9 ALCOHOL USE: Status: ACTIVE | Noted: 2019-10-28

## 2019-10-28 LAB
ALBUMIN SERPL BCP-MCNC: 4.4 G/DL (ref 3.5–5.2)
ALP SERPL-CCNC: 50 U/L (ref 55–135)
ALT SERPL W/O P-5'-P-CCNC: 19 U/L (ref 10–44)
AMPHET+METHAMPHET UR QL: NEGATIVE
AMYLASE SERPL-CCNC: 48 U/L (ref 20–110)
ANION GAP SERPL CALC-SCNC: 10 MMOL/L (ref 8–16)
AST SERPL-CCNC: 22 U/L (ref 10–40)
BARBITURATES UR QL SCN>200 NG/ML: NEGATIVE
BASOPHILS # BLD AUTO: 0.05 K/UL (ref 0–0.2)
BASOPHILS NFR BLD: 0.9 % (ref 0–1.9)
BENZODIAZ UR QL SCN>200 NG/ML: NEGATIVE
BILIRUB SERPL-MCNC: 0.7 MG/DL (ref 0.1–1)
BILIRUB UR QL STRIP: NEGATIVE
BNP SERPL-MCNC: 24 PG/ML (ref 0–99)
BUN SERPL-MCNC: 16 MG/DL (ref 8–23)
BZE UR QL SCN: NEGATIVE
CALCIUM SERPL-MCNC: 9.3 MG/DL (ref 8.7–10.5)
CANNABINOIDS UR QL SCN: NEGATIVE
CHLORIDE SERPL-SCNC: 102 MMOL/L (ref 95–110)
CK MB SERPL-MCNC: 3.6 NG/ML (ref 0.1–6.5)
CK SERPL-CCNC: 136 U/L (ref 20–180)
CLARITY UR: CLEAR
CO2 SERPL-SCNC: 28 MMOL/L (ref 23–29)
COLOR UR: YELLOW
CREAT SERPL-MCNC: 0.7 MG/DL (ref 0.5–1.4)
CREAT UR-MCNC: 24 MG/DL (ref 15–325)
DIFFERENTIAL METHOD: ABNORMAL
EOSINOPHIL # BLD AUTO: 0.4 K/UL (ref 0–0.5)
EOSINOPHIL NFR BLD: 6.8 % (ref 0–8)
ERYTHROCYTE [DISTWIDTH] IN BLOOD BY AUTOMATED COUNT: 14.3 % (ref 11.5–14.5)
EST. GFR  (AFRICAN AMERICAN): >60 ML/MIN/1.73 M^2
EST. GFR  (NON AFRICAN AMERICAN): >60 ML/MIN/1.73 M^2
GLUCOSE SERPL-MCNC: 86 MG/DL (ref 70–110)
GLUCOSE UR QL STRIP: NEGATIVE
HCT VFR BLD AUTO: 43.4 % (ref 37–48.5)
HGB BLD-MCNC: 14.1 G/DL (ref 12–16)
HGB UR QL STRIP: NEGATIVE
IMM GRANULOCYTES # BLD AUTO: 0.01 K/UL (ref 0–0.04)
IMM GRANULOCYTES NFR BLD AUTO: 0.2 % (ref 0–0.5)
INR PPP: 1
KETONES UR QL STRIP: NEGATIVE
LEUKOCYTE ESTERASE UR QL STRIP: NEGATIVE
LIPASE SERPL-CCNC: 40 U/L (ref 4–60)
LYMPHOCYTES # BLD AUTO: 1.9 K/UL (ref 1–4.8)
LYMPHOCYTES NFR BLD: 34.5 % (ref 18–48)
MAGNESIUM SERPL-MCNC: 2.1 MG/DL (ref 1.6–2.6)
MCH RBC QN AUTO: 31.4 PG (ref 27–31)
MCHC RBC AUTO-ENTMCNC: 32.5 G/DL (ref 32–36)
MCV RBC AUTO: 97 FL (ref 82–98)
MONOCYTES # BLD AUTO: 0.5 K/UL (ref 0.3–1)
MONOCYTES NFR BLD: 9.2 % (ref 4–15)
NEUTROPHILS # BLD AUTO: 2.6 K/UL (ref 1.8–7.7)
NEUTROPHILS NFR BLD: 48.4 % (ref 38–73)
NITRITE UR QL STRIP: NEGATIVE
NRBC BLD-RTO: 0 /100 WBC
OPIATES UR QL SCN: NEGATIVE
PCP UR QL SCN>25 NG/ML: NEGATIVE
PH UR STRIP: 7 [PH] (ref 5–8)
PLATELET # BLD AUTO: 266 K/UL (ref 150–350)
PMV BLD AUTO: 9.6 FL (ref 9.2–12.9)
POTASSIUM SERPL-SCNC: 4 MMOL/L (ref 3.5–5.1)
PROT SERPL-MCNC: 7.1 G/DL (ref 6–8.4)
PROT UR QL STRIP: NEGATIVE
PROTHROMBIN TIME: 12.3 SEC (ref 10.6–14.8)
RBC # BLD AUTO: 4.49 M/UL (ref 4–5.4)
SODIUM SERPL-SCNC: 140 MMOL/L (ref 136–145)
SP GR UR STRIP: >1.03 (ref 1–1.03)
TOXICOLOGY INFORMATION: NORMAL
TROPONIN I SERPL DL<=0.01 NG/ML-MCNC: <0.03 NG/ML (ref 0.02–0.04)
TSH SERPL DL<=0.005 MIU/L-ACNC: 0.93 UIU/ML (ref 0.34–5.6)
TSH SERPL DL<=0.005 MIU/L-ACNC: 1.5 UIU/ML (ref 0.34–5.6)
URN SPEC COLLECT METH UR: ABNORMAL
UROBILINOGEN UR STRIP-ACNC: NEGATIVE EU/DL
VIT B12 SERPL-MCNC: 694 PG/ML (ref 210–950)
WBC # BLD AUTO: 5.45 K/UL (ref 3.9–12.7)

## 2019-10-28 PROCEDURE — 80307 DRUG TEST PRSMV CHEM ANLYZR: CPT

## 2019-10-28 PROCEDURE — 82607 VITAMIN B-12: CPT

## 2019-10-28 PROCEDURE — 82553 CREATINE MB FRACTION: CPT

## 2019-10-28 PROCEDURE — 36415 COLL VENOUS BLD VENIPUNCTURE: CPT

## 2019-10-28 PROCEDURE — 85610 PROTHROMBIN TIME: CPT

## 2019-10-28 PROCEDURE — 84484 ASSAY OF TROPONIN QUANT: CPT

## 2019-10-28 PROCEDURE — G0378 HOSPITAL OBSERVATION PER HR: HCPCS

## 2019-10-28 PROCEDURE — 83690 ASSAY OF LIPASE: CPT

## 2019-10-28 PROCEDURE — 25500020 PHARM REV CODE 255: Performed by: FAMILY MEDICINE

## 2019-10-28 PROCEDURE — 83735 ASSAY OF MAGNESIUM: CPT

## 2019-10-28 PROCEDURE — 84443 ASSAY THYROID STIM HORMONE: CPT

## 2019-10-28 PROCEDURE — 93306 TTE W/DOPPLER COMPLETE: CPT

## 2019-10-28 PROCEDURE — 63600175 PHARM REV CODE 636 W HCPCS: Performed by: FAMILY MEDICINE

## 2019-10-28 PROCEDURE — 82550 ASSAY OF CK (CPK): CPT

## 2019-10-28 PROCEDURE — 83880 ASSAY OF NATRIURETIC PEPTIDE: CPT

## 2019-10-28 PROCEDURE — 84443 ASSAY THYROID STIM HORMONE: CPT | Mod: 91

## 2019-10-28 PROCEDURE — 81003 URINALYSIS AUTO W/O SCOPE: CPT | Mod: 59

## 2019-10-28 PROCEDURE — 96372 THER/PROPH/DIAG INJ SC/IM: CPT | Mod: 59

## 2019-10-28 PROCEDURE — 84425 ASSAY OF VITAMIN B-1: CPT

## 2019-10-28 PROCEDURE — 85025 COMPLETE CBC W/AUTO DIFF WBC: CPT

## 2019-10-28 PROCEDURE — 99285 EMERGENCY DEPT VISIT HI MDM: CPT | Mod: 25

## 2019-10-28 PROCEDURE — 93005 ELECTROCARDIOGRAM TRACING: CPT

## 2019-10-28 PROCEDURE — 25000003 PHARM REV CODE 250: Performed by: FAMILY MEDICINE

## 2019-10-28 PROCEDURE — 82150 ASSAY OF AMYLASE: CPT

## 2019-10-28 PROCEDURE — 80053 COMPREHEN METABOLIC PANEL: CPT

## 2019-10-28 RX ORDER — ASPIRIN 325 MG
325 TABLET ORAL
Status: COMPLETED | OUTPATIENT
Start: 2019-10-28 | End: 2019-10-28

## 2019-10-28 RX ORDER — PNV NO.95/FERROUS FUM/FOLIC AC 28MG-0.8MG
100 TABLET ORAL DAILY
Status: DISCONTINUED | OUTPATIENT
Start: 2019-10-29 | End: 2019-10-29 | Stop reason: HOSPADM

## 2019-10-28 RX ORDER — ATORVASTATIN CALCIUM 40 MG/1
40 TABLET, FILM COATED ORAL DAILY
Status: DISCONTINUED | OUTPATIENT
Start: 2019-10-29 | End: 2019-10-29 | Stop reason: HOSPADM

## 2019-10-28 RX ORDER — HYDRALAZINE HYDROCHLORIDE 20 MG/ML
10 INJECTION INTRAMUSCULAR; INTRAVENOUS EVERY 4 HOURS PRN
Status: DISCONTINUED | OUTPATIENT
Start: 2019-10-28 | End: 2019-10-29 | Stop reason: HOSPADM

## 2019-10-28 RX ORDER — ONDANSETRON 2 MG/ML
4 INJECTION INTRAMUSCULAR; INTRAVENOUS EVERY 8 HOURS PRN
Status: DISCONTINUED | OUTPATIENT
Start: 2019-10-28 | End: 2019-10-29 | Stop reason: HOSPADM

## 2019-10-28 RX ORDER — SODIUM CHLORIDE 0.9 % (FLUSH) 0.9 %
10 SYRINGE (ML) INJECTION
Status: DISCONTINUED | OUTPATIENT
Start: 2019-10-28 | End: 2019-10-29 | Stop reason: HOSPADM

## 2019-10-28 RX ORDER — CETIRIZINE HYDROCHLORIDE 10 MG/1
10 TABLET ORAL DAILY
COMMUNITY

## 2019-10-28 RX ORDER — POLYETHYLENE GLYCOL 3350 17 G/17G
17 POWDER, FOR SOLUTION ORAL 2 TIMES DAILY PRN
Status: DISCONTINUED | OUTPATIENT
Start: 2019-10-28 | End: 2019-10-29 | Stop reason: HOSPADM

## 2019-10-28 RX ORDER — CETIRIZINE HYDROCHLORIDE 10 MG/1
10 TABLET ORAL DAILY
Status: DISCONTINUED | OUTPATIENT
Start: 2019-10-29 | End: 2019-10-29 | Stop reason: HOSPADM

## 2019-10-28 RX ORDER — ASPIRIN 81 MG/1
81 TABLET ORAL DAILY
Status: DISCONTINUED | OUTPATIENT
Start: 2019-10-29 | End: 2019-10-29 | Stop reason: HOSPADM

## 2019-10-28 RX ORDER — ENOXAPARIN SODIUM 100 MG/ML
40 INJECTION SUBCUTANEOUS EVERY 24 HOURS
Status: DISCONTINUED | OUTPATIENT
Start: 2019-10-28 | End: 2019-10-29 | Stop reason: HOSPADM

## 2019-10-28 RX ADMIN — ENOXAPARIN SODIUM 40 MG: 100 INJECTION SUBCUTANEOUS at 07:10

## 2019-10-28 RX ADMIN — IOHEXOL 100 ML: 350 INJECTION, SOLUTION INTRAVENOUS at 02:10

## 2019-10-28 RX ADMIN — ASPIRIN 325 MG ORAL TABLET 325 MG: 325 PILL ORAL at 04:10

## 2019-10-28 NOTE — SUBJECTIVE & OBJECTIVE
Past Medical History:   Diagnosis Date    Depression        No past surgical history on file.    Review of patient's allergies indicates:  No Known Allergies    No current facility-administered medications on file prior to encounter.      Current Outpatient Medications on File Prior to Encounter   Medication Sig    cetirizine (ZYRTEC) 10 MG tablet Take 10 mg by mouth once daily.    cyanocobalamin (VITAMIN B-12) 100 MCG tablet Take 100 mcg by mouth once daily.    [DISCONTINUED] escitalopram oxalate (LEXAPRO) 10 MG tablet Take 1 tablet (10 mg total) by mouth once daily.    [DISCONTINUED] vitamin D (VITAMIN D3) 1000 units Tab Take 1,000 Units by mouth once daily.     Family History     Problem Relation (Age of Onset)    Hypertension Father        Tobacco Use    Smoking status: Current Some Day Smoker    Smokeless tobacco: Never Used   Substance and Sexual Activity    Alcohol use: Yes     Alcohol/week: 21.0 standard drinks     Types: 21 Cans of beer per week    Drug use: No    Sexual activity: Not on file     Review of Systems   All other systems reviewed and are negative.    Objective:     Vital Signs (Most Recent):  Temp: 98.3 °F (36.8 °C) (10/28/19 1111)  Pulse: 65 (10/28/19 1217)  Resp: 16 (10/28/19 1111)  BP: (!) 142/75 (10/28/19 1217)  SpO2: 99 % (10/28/19 1217) Vital Signs (24h Range):  Temp:  [98.3 °F (36.8 °C)] 98.3 °F (36.8 °C)  Pulse:  [65-69] 65  Resp:  [16] 16  SpO2:  [99 %-100 %] 99 %  BP: (142-182)/(75-92) 142/75     Weight: 63.5 kg (140 lb)  Body mass index is 25.61 kg/m².    Physical Exam   Vitals reviewed.  Gen: alert, responsive  HEENT:  Eyes - no pallor; PERRLA, EOMI, no nystagmus  External ears with no lesions  Nares patent  Mouth - lips chapped  CV: RRR  Lungs: CTA B/L  Abd: +BS, soft, NT, ND  Ext: no atrophy or edema  Skin: warm, dry  Neuro: CN intact, motor 5/5, no pronator drift, no gait abnormality  L face numbness  Psych: pleasant    All labs, images, and other studies reviewed  personally by me.       Significant Labs:   CBC:   Recent Labs   Lab 10/28/19  1135   WBC 5.45   HGB 14.1   HCT 43.4        CMP:   Recent Labs   Lab 10/28/19  1135      K 4.0      CO2 28   GLU 86   BUN 16   CREATININE 0.7   CALCIUM 9.3   PROT 7.1   ALBUMIN 4.4   BILITOT 0.7   ALKPHOS 50*   AST 22   ALT 19   ANIONGAP 10   EGFRNONAA >60.0     Cardiac Markers:   Recent Labs   Lab 10/28/19  1135   BNP 24     Coagulation:   Recent Labs   Lab 10/28/19  1135   INR 1.0     Lipid Panel: No results for input(s): CHOL, HDL, LDLCALC, TRIG, CHOLHDL in the last 48 hours.  Troponin:   Recent Labs   Lab 10/28/19  1135   TROPONINI <0.030       Significant Imaging: CT head: I have reviewed all pertinent results/findings within the past 24 hours and my personal findings are:  No acute abnormalities   CXR: I have reviewed all pertinent results/findings within the past 24 hours and my personal findings are:  No acute abnormalities   EKG: I have reviewed all pertinent results/findings within the past 24 hours and my personal findings are: NSR

## 2019-10-28 NOTE — H&P
[facility] Medicine  History & Physical    Patient Name: Laura Patino  MRN: [id number]  Admission Date: 10/28/2019  Attending Physician: Celeste Lindsey MD   Primary Care Provider: Tanika Maynard MD    Patient information was obtained from patient, spouse/SO, past medical records, ED physician and ER records.     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  Onset this AM, stable progression   Located L face, mandibular and maxilla region, with no radiation  Never happened before  Also with dizziness x 3 days, progressively worsening  Worse when she turns her head or gets up  Also with L arm tingling and numbness x 3 weeks  She works in a cafeteria lifting heavy boxes so she didn't think anything of the arm symptoms  After worsening dizziness, she contacted her PCP  The triage nurse told her to present to ED  Pt without hx of DM, HTN  Only takes zyrtec at home  No asa, no statin, no BP meds  Drinks 3 beers a day  No dysarthria, no dysphagia  No headache, no vision changes  No CP, no SOB  No nausea  No weakness    Past Medical History:   Diagnosis Date    Depression        No past surgical history on file.    Review of patient's allergies indicates:  No Known Allergies    No current facility-administered medications on file prior to encounter.      Current Outpatient Medications on File Prior to Encounter   Medication Sig    cetirizine (ZYRTEC) 10 MG tablet Take 10 mg by mouth once daily.    cyanocobalamin (VITAMIN B-12) 100 MCG tablet Take 100 mcg by mouth once daily.    [DISCONTINUED] escitalopram oxalate (LEXAPRO) 10 MG tablet Take 1 tablet (10 mg total) by mouth once daily.    [DISCONTINUED] vitamin D (VITAMIN D3) 1000 units Tab Take 1,000  Units by mouth once daily.     Family History     Problem Relation (Age of Onset)    Hypertension Father        Tobacco Use    Smoking status: Current Some Day Smoker    Smokeless tobacco: Never Used   Substance and Sexual Activity    Alcohol use: Yes     Alcohol/week: 21.0 standard drinks     Types: 21 Cans of beer per week    Drug use: No    Sexual activity: Not on file     Review of Systems   All other systems reviewed and are negative.    Objective:     Vital Signs (Most Recent):  Temp: 98.3 °F (36.8 °C) (10/28/19 1111)  Pulse: 65 (10/28/19 1217)  Resp: 16 (10/28/19 1111)  BP: (!) 142/75 (10/28/19 1217)  SpO2: 99 % (10/28/19 1217) Vital Signs (24h Range):  Temp:  [98.3 °F (36.8 °C)] 98.3 °F (36.8 °C)  Pulse:  [65-69] 65  Resp:  [16] 16  SpO2:  [99 %-100 %] 99 %  BP: (142-182)/(75-92) 142/75     Weight: 63.5 kg (140 lb)  Body mass index is 25.61 kg/m².    Physical Exam   Vitals reviewed.  Gen: alert, responsive  HEENT:  Eyes - no pallor; PERRLA, EOMI, no nystagmus  External ears with no lesions  Nares patent  Mouth - lips chapped  CV: RRR  Lungs: CTA B/L  Abd: +BS, soft, NT, ND  Ext: no atrophy or edema  Skin: warm, dry  Neuro: CN intact, motor 5/5, no pronator drift, no gait abnormality  L face numbness  Psych: pleasant    All labs, images, and other studies reviewed personally by me.       Significant Labs:   CBC:   Recent Labs   Lab 10/28/19  1135   WBC 5.45   HGB 14.1   HCT 43.4        CMP:   Recent Labs   Lab 10/28/19  1135      K 4.0      CO2 28   GLU 86   BUN 16   CREATININE 0.7   CALCIUM 9.3   PROT 7.1   ALBUMIN 4.4   BILITOT 0.7   ALKPHOS 50*   AST 22   ALT 19   ANIONGAP 10   EGFRNONAA >60.0     Cardiac Markers:   Recent Labs   Lab 10/28/19  1135   BNP 24     Coagulation:   Recent Labs   Lab 10/28/19  1135   INR 1.0     Lipid Panel: No results for input(s): CHOL, HDL, LDLCALC, TRIG, CHOLHDL in the last 48 hours.  Troponin:   Recent Labs   Lab 10/28/19  1135   TROPONINI <0.030        Significant Imaging: CT head: I have reviewed all pertinent results/findings within the past 24 hours and my personal findings are:  No acute abnormalities   CXR: I have reviewed all pertinent results/findings within the past 24 hours and my personal findings are:  No acute abnormalities   EKG: I have reviewed all pertinent results/findings within the past 24 hours and my personal findings are: NSR    Assessment/Plan:     Patient Active Problem List   Diagnosis    Tobacco use    TIA (transient ischemic attack)    Alcohol use     TIA  - stroke protocol  - neurology consulted, thank you  - MRI/MRA Brain  - CT head as above  - CT neck  - echo with bubble  - PT/OT/SLP  - start asa, statin  - permissive HTN  - hydralazine prn  - telemetry     Tobacco use  - counseling    Alcohol use  - Vit B12, thiamine levels    Electrolyte derangement:  Trend BMP, Replacement prn  Diet: NPO until SLP evaluation  DVT ppx: lovenox  FULL CODE    Celeste Lindsey MD  Department of Hospital Medicine   [facility]

## 2019-10-28 NOTE — HPI
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

## 2019-10-28 NOTE — ED PROVIDER NOTES
Encounter Date: 10/28/2019       History     Chief Complaint   Patient presents with    Dizziness     FEELING OFF BALANCE , SINCE FRIDAY    ARM TINGLING     LEFT X 3 WEEKS     This is a 64-year-old female who presents with several complaints. The patient reports drive about 3 weeks ago she developed tingling sensation to her left arm.  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  She does not recall any particular trauma but does lifting on a regular basis.  She states that the tingling and numb sensation was located from the left shoulder down to the left hand.  There is no associated neck pain or any worsening of symptoms with neck movement.  She denies any loss of function or weakness of her arm.  She denies any headache. Three days ago she developed dizziness.  The dizziness was described as a feeling of being off balance.  Symptoms were gradual in onset.  They were worse with head movement.  She did not have associated nausea or vomiting. She denies headache or visual changes.  Symptoms were worse with head movement and ambulation and better with rest.  She denies any associated headache, chest pain, shortness of breath, palpitations, syncope or near syncope.  She also reports she has had intermittent tingling to the left lower aspect of her face over the past 2 days as well. She does not have any known history of hypertension or hyperlipidemia.  Her mom had a history of coronary artery disease but there are no known strokes in her family.  She does smoke.  She drinks 3 cans of beer daily.  She denies any drug use or trauma.  She denies any other problems or complaints.        Review of patient's allergies indicates:  No Known Allergies  Past Medical History:   Diagnosis Date    Depression      No past surgical history on file.  Family History   Problem Relation Age of Onset    Hypertension Father     Glaucoma Neg Hx     Macular degeneration Neg Hx     Retinal  detachment Neg Hx      Social History     Tobacco Use    Smoking status: Current Some Day Smoker    Smokeless tobacco: Never Used   Substance Use Topics    Alcohol use: Yes     Alcohol/week: 21.0 standard drinks     Types: 21 Cans of beer per week    Drug use: No     Review of Systems   Constitutional: Negative.  Negative for activity change, appetite change, chills, diaphoresis, fatigue, fever and unexpected weight change.   HENT: Negative.  Negative for congestion, dental problem, drooling, ear discharge, ear pain, facial swelling, hearing loss, mouth sores, rhinorrhea, sinus pressure, sinus pain, sneezing, sore throat, tinnitus, trouble swallowing and voice change.    Eyes: Negative.  Negative for photophobia, pain, redness and visual disturbance.   Respiratory: Negative.  Negative for cough, chest tightness, shortness of breath and wheezing.    Cardiovascular: Negative.  Negative for chest pain, palpitations and leg swelling.   Gastrointestinal: Negative.  Negative for abdominal distention, abdominal pain, anal bleeding, blood in stool, constipation, diarrhea, nausea and vomiting.   Endocrine: Negative.    Genitourinary: Negative.  Negative for decreased urine volume, difficulty urinating, dysuria, flank pain, frequency, hematuria, pelvic pain and urgency.   Musculoskeletal: Negative.  Negative for arthralgias, back pain, gait problem, joint swelling, myalgias, neck pain and neck stiffness.   Skin: Negative.  Negative for pallor and rash.   Allergic/Immunologic: Negative.    Neurological: Positive for dizziness and numbness. Negative for tremors, seizures, syncope, facial asymmetry, speech difficulty, weakness, light-headedness and headaches.   Hematological: Does not bruise/bleed easily.   Psychiatric/Behavioral: Negative.  Negative for confusion.   All other systems reviewed and are negative.      Physical Exam     Initial Vitals [10/28/19 1111]   BP Pulse Resp Temp SpO2   (!) 182/92 69 16 98.3 °F (36.8  °C) 100 %      MAP       --         Physical Exam    Nursing note and vitals reviewed.  Constitutional: She is active and cooperative.  Non-toxic appearance. She does not have a sickly appearance. She does not appear ill.   HENT:   Head: Normocephalic and atraumatic.   Nose: Nose normal.   Mouth/Throat: Oropharynx is clear and moist. No oropharyngeal exudate.   Eyes: Conjunctivae, EOM and lids are normal. Pupils are equal, round, and reactive to light. No scleral icterus.   Neck: Trachea normal, normal range of motion, full passive range of motion without pain and phonation normal. Neck supple. No thyroid mass present. No stridor present. No spinous process tenderness present. No edema, no erythema and normal range of motion present. No neck rigidity. No JVD present.   Cardiovascular: Normal rate, regular rhythm, normal heart sounds, intact distal pulses and normal pulses.   No murmur heard.  Pulmonary/Chest: Effort normal and breath sounds normal. No accessory muscle usage. No tachypnea. No respiratory distress.   Abdominal: Soft. Normal appearance and bowel sounds are normal. She exhibits no distension, no pulsatile midline mass and no mass. There is no tenderness. There is no rigidity, no guarding and no CVA tenderness.   Musculoskeletal: Normal range of motion. She exhibits no edema or tenderness.   Pulses are 2+ throughout, cap refill is less than 2 sec throughout, extremities are nontender throughout with full range of motion. There is no spinal tenderness to palpation.   Neurological: She is alert and oriented to person, place, and time. She has normal strength. She displays normal reflexes. No cranial nerve deficit or sensory deficit. She displays a negative Romberg sign. Coordination and gait normal.   No focal deficits.   Skin: Skin is intact. Capillary refill takes less than 2 seconds. No ecchymosis, no petechiae and no rash noted. No erythema. No pallor.   Psychiatric: She has a normal mood and affect.  Her speech is normal and behavior is normal. Judgment and thought content normal. Cognition and memory are normal.         ED Course   Procedures  Labs Reviewed   CBC W/ AUTO DIFFERENTIAL - Abnormal; Notable for the following components:       Result Value    Mean Corpuscular Hemoglobin 31.4 (*)     All other components within normal limits   COMPREHENSIVE METABOLIC PANEL - Abnormal; Notable for the following components:    Alkaline Phosphatase 50 (*)     All other components within normal limits   B-TYPE NATRIURETIC PEPTIDE   TROPONIN I   PROTIME-INR   AMYLASE   LIPASE   MAGNESIUM   TSH   CK   CK-MB   AMYLASE   MAGNESIUM   LIPASE   TSH   CK   CK-MB   URINALYSIS   DRUG SCREEN PANEL, URINE EMERGENCY   TSH   VITAMIN B12   VITAMIN B1        ECG Results          EKG 12-lead (In process)  Result time 10/28/19 13:26:40    In process by Interface, Lab In [facility] (10/28/19 13:26:40)                 Narrative:    Test Reason : R07.9,    Vent. Rate : 073 BPM     Atrial Rate : 073 BPM     P-R Int : 144 ms          QRS Dur : 076 ms      QT Int : 394 ms       P-R-T Axes : 059 061 043 degrees     QTc Int : 434 ms    Normal sinus rhythm  Possible Left atrial enlargement  Borderline Abnormal ECG  No previous ECGs available    Referred By: ZULY LEIGH           Confirmed By:                   In process by Interface, Lab In [facility] (10/28/19 13:25:43)                 Narrative:    Test Reason : R07.9,    Vent. Rate : 073 BPM     Atrial Rate : 073 BPM     P-R Int : 144 ms          QRS Dur : 076 ms      QT Int : 394 ms       P-R-T Axes : 059 061 043 degrees     QTc Int : 434 ms    Normal sinus rhythm  Possible Left atrial enlargement  Borderline Abnormal ECG  No previous ECGs available    Referred By: ZULY LEIGH           Confirmed By:                             Imaging Results          CTA Head and Neck (xpd) (Final result)  Result time 10/28/19 14:43:44    Final result by Erick Gutiérrez MD (10/28/19 14:43:44)                  Impression:      1. Mild atheromatous plaque of the left carotid bulb and both ICA origins, with no significant carotid arterial stenosis or vascular occlusion.  2. Widely patent vertebral arteries.  3. No significant abnormality of the intracranial arterial vasculature.      Electronically signed by: Erick Gutiérrez MD  Date:    10/28/2019  Time:    14:43             Narrative:    EXAMINATION:  CTA HEAD AND NECK (XPD)    CLINICAL HISTORY:  Acute TIA, paresthesias and dizziness;    TECHNIQUE:  CMS MANDATED QUALITY DATA-CT RADIATION DOSE-436, CAROTID-195    All CT scans at this facility use dose modulation, iterative reconstruction, and or weight based dosing when appropriate, to reduce radiation dose to as low as reasonably achievable. NASCET criteria were utilized for evaluation of carotid arterial stenosis.    Thin axial imaging through the head neck was performed with 100 mL Omnipaque 350 IV contrast, with sagittal and coronal reformatted images and MIP reconstructions performed, and images stored in the patient's permanent electronic medical record.    COMPARISON:  Noncontrast head CT of the same day.    FINDINGS:  CTA BRAIN: The distal intracranial segments of the vertebral arteries are patent, with the basilar artery widely patent.  The distal cervical, petrous, cavernous and supraclinoid segments of both internal carotid arteries are widely patent.    The bilateral anterior, middle and posterior cerebral arteries are widely patent and taper appropriately, with no intracranial aneurysm or vascular malformation.  The dural venous sinuses enhance normally, with the visualized intracranial compartment enhancing normally.  There is paranasal sinus mucosal thickening, with the mastoid air cells normally aerated and clear.  No significant osseous abnormality.    CTA NECK: The aortic arch and arch vessel origins are widely patent.  Both subclavian arteries are widely patent, with both common carotid arteries widely  patent to the level of the carotid bulbs.  There is focal calcified and soft plaque along the lateral wall of the left carotid bulb, with mild hypodense soft plaque of both ICA origins.  Both internal carotid arteries are widely patent through the level of the skull base.  The external carotid arteries and branches are widely patent.  There is no aneurysm or vascular malformation.    Both vertebral arteries arise normally from the subclavian arteries, and are widely patent, with the left vertebral artery dominant.  There is no aneurysm or vascular malformation, with no arterial dissection.    The superior mediastinum enhances normally, with the cervical soft tissues enhancing normally.  There is degenerative intervertebral disc space narrowing and facet arthropathy in the cervical spine.  The lung apices are clear.                               CT Head Without Contrast (Final result)  Result time 10/28/19 13:28:04    Final result by Darren Zimmerman MD (10/28/19 13:28:04)                 Impression:      No acute intracranial process      Electronically signed by: Darren Zimmerman MD  Date:    10/28/2019  Time:    13:28             Narrative:    CLINICAL HISTORY:  ([id number])65 y/o  (1955) F    paresthesias;    TECHNIQUE:  (A#[id number], exam time 10/28/2019 13:26)    CT HEAD WITHOUT CONTRAST [id number]    Axial CT of the brain without contrast using soft tissue and bone algorithm. Please note in the acute setting if there is a clinical concern for an acute stroke MRI would be more sensitive/specific for evaluation of ischemia.    CMS MANDATED QUALITY DATA - CT RADIATION - 436    All CT scans at this facility utilize dose modulation, iterative reconstruction, and/or weight based dosing when appropriate to reduce radiation dose to as low as reasonably achievable.    COMPARISON:  None available.    FINDINGS:  No acute intracranial hemorrhage, edema or mass effect, and no acute parenchymal abnormality. There is no  hydrocephalus, herniation or midline shift, and the basal and suprasellar cisterns are within normal limits. The osseous structures show no acute skull fracture. The ventricles and sulci are normal. There is normal gray white differentiation. Orbital contents appear within normal limits. External auditory canals are unremarkable.  The visualized paranasal sinuses show scattered air submucosal thickening and debris within the mid ethmoid air cells, right maxillary sinus and frontal sinus.                               X-Ray Chest PA And Lateral (Final result)  Result time 10/28/19 11:33:19    Final result by Erick Gutiérrez MD (10/28/19 11:33:19)                 Impression:      No evidence of active cardiopulmonary disease.      Electronically signed by: Erick Gutiérrez MD  Date:    10/28/2019  Time:    11:33             Narrative:    EXAMINATION:  XR CHEST PA AND LATERAL    CLINICAL HISTORY:  Chest Pain;    FINDINGS:  PA and lateral chest radiograph at 11:24 hours with no prior studies for comparison show the trachea is midline, with the cardiomediastinal silhouette and pulmonary vascular distribution within normal limits.    The lungs are normally and symmetrically expanded, with no consolidation, pleural effusion or evidence of pulmonary edema. No confluent infiltrates or pneumothorax.  There are bibasilar linear opacities consistent with subsegmental atelectasis.  No significant osseous abnormalities.                                 Medical Decision Making:   Clinical Tests:   Lab Tests: Reviewed  Radiological Study: Reviewed  Medical Tests: Reviewed  ED Management:  Patient is currently neurologically normal.  There are no deficits on exam.  I have discussed the case with Dr. Lawson on-call for Neurology.  He recommends inpatient evaluation and has given recommendations for further imaging.  I have discussed the case with the hospitalist physician who has assumed care of the pt.                       Clinical  Impression:       ICD-10-CM ICD-9-CM   1. Dizziness R42 780.4   2. TIA (transient ischemic attack) G45.9 435.9                                Kezia Vera MD  10/28/19 7821

## 2019-10-28 NOTE — TELEPHONE ENCOUNTER
Reason for Disposition   New neurologic deficit that is present now: * Weakness of the face, arm, or leg on one side of the body * Numbness of the face, arm, or leg on one side of the body * Loss of speech or garbled speech    Additional Information   Negative: Shock suspected (e.g., cold/pale/clammy skin, too weak to stand, low BP, rapid pulse)   Negative: Difficult to awaken or acting confused (e.g., disoriented, slurred speech)   Negative: Fainted, and still feels dizzy afterwards   Negative: Severe difficulty breathing (e.g., struggling for each breath, speaks in single words)   Negative: Overdose (accidental or intentional) of medications    Protocols used: DIZZINESS-A-OH    Pt called to report she feels dizzy and lightheaded, stated she is now having numbness and weakness on the left side of her face and tingling down her arm. Pt advised to call 911.

## 2019-10-28 NOTE — ED NOTES
Pt states has been having left arm tingling x 3 weeks. On Friday started with dizziness and left face tingling, more on Saturday,more on Sunday, and today worse with dizziness and tingloing to face and left arm

## 2019-10-29 VITALS
OXYGEN SATURATION: 96 % | SYSTOLIC BLOOD PRESSURE: 129 MMHG | DIASTOLIC BLOOD PRESSURE: 77 MMHG | WEIGHT: 143.88 LBS | BODY MASS INDEX: 26.48 KG/M2 | HEIGHT: 62 IN | RESPIRATION RATE: 20 BRPM | TEMPERATURE: 98 F | HEART RATE: 67 BPM

## 2019-10-29 PROBLEM — G45.9 TIA (TRANSIENT ISCHEMIC ATTACK): Status: RESOLVED | Noted: 2019-10-28 | Resolved: 2019-10-29

## 2019-10-29 PROBLEM — Z78.9 ALCOHOL USE: Status: RESOLVED | Noted: 2019-10-28 | Resolved: 2019-10-29

## 2019-10-29 LAB
ALBUMIN SERPL BCP-MCNC: 4 G/DL (ref 3.5–5.2)
ALP SERPL-CCNC: 44 U/L (ref 55–135)
ALT SERPL W/O P-5'-P-CCNC: 22 U/L (ref 10–44)
ANION GAP SERPL CALC-SCNC: 8 MMOL/L (ref 8–16)
AORTIC ROOT ANNULUS: 3.45 CM
AORTIC VALVE CUSP SEPERATION: 2.08 CM
APTT PPP: 26.2 SEC (ref 23.6–33.3)
AST SERPL-CCNC: 24 U/L (ref 10–40)
AV INDEX (PROSTH): 0.87
AV MEAN GRADIENT: 3 MMHG
AV PEAK GRADIENT: 5 MMHG
AV VALVE AREA: 3.08 CM2
AV VELOCITY RATIO: 82.18
BASOPHILS # BLD AUTO: 0.05 K/UL (ref 0–0.2)
BASOPHILS NFR BLD: 0.8 % (ref 0–1.9)
BILIRUB SERPL-MCNC: 0.6 MG/DL (ref 0.1–1)
BSA FOR ECHO PROCEDURE: 1.67 M2
BUN SERPL-MCNC: 21 MG/DL (ref 8–23)
CALCIUM SERPL-MCNC: 9.1 MG/DL (ref 8.7–10.5)
CHLORIDE SERPL-SCNC: 107 MMOL/L (ref 95–110)
CHOLEST SERPL-MCNC: 203 MG/DL (ref 120–199)
CHOLEST/HDLC SERPL: 3 {RATIO} (ref 2–5)
CK MB SERPL-MCNC: 2.2 NG/ML (ref 0.1–6.5)
CO2 SERPL-SCNC: 26 MMOL/L (ref 23–29)
CREAT SERPL-MCNC: 0.6 MG/DL (ref 0.5–1.4)
CV ECHO LV RWT: 0.38 CM
DIFFERENTIAL METHOD: ABNORMAL
DOP CALC AO PEAK VEL: 1.16 M/S
DOP CALC AO VTI: 24.87 CM
DOP CALC LVOT AREA: 3.5 CM2
DOP CALC LVOT DIAMETER: 2.12 CM
DOP CALC LVOT PEAK VEL: 95.33 M/S
DOP CALC LVOT STROKE VOLUME: 76.49 CM3
DOP CALCLVOT PEAK VEL VTI: 21.68 CM
E WAVE DECELERATION TIME: 240.35 MSEC
E/A RATIO: 0.76
E/E' RATIO: 10.46 M/S
ECHO LV POSTERIOR WALL: 0.86 CM (ref 0.6–1.1)
EOSINOPHIL # BLD AUTO: 0.5 K/UL (ref 0–0.5)
EOSINOPHIL NFR BLD: 8.8 % (ref 0–8)
ERYTHROCYTE [DISTWIDTH] IN BLOOD BY AUTOMATED COUNT: 14.4 % (ref 11.5–14.5)
EST. GFR  (AFRICAN AMERICAN): >60 ML/MIN/1.73 M^2
EST. GFR  (NON AFRICAN AMERICAN): >60 ML/MIN/1.73 M^2
ESTIMATED AVG GLUCOSE: 114 MG/DL (ref 68–131)
FRACTIONAL SHORTENING: 26 % (ref 28–44)
GLUCOSE SERPL-MCNC: 97 MG/DL (ref 70–110)
HBA1C MFR BLD HPLC: 5.6 % (ref 4.5–6.2)
HCT VFR BLD AUTO: 40.1 % (ref 37–48.5)
HDLC SERPL-MCNC: 68 MG/DL (ref 40–75)
HDLC SERPL: 33.5 % (ref 20–50)
HGB BLD-MCNC: 13 G/DL (ref 12–16)
IMM GRANULOCYTES # BLD AUTO: 0.01 K/UL (ref 0–0.04)
IMM GRANULOCYTES NFR BLD AUTO: 0.2 % (ref 0–0.5)
INR PPP: 1
INTERVENTRICULAR SEPTUM: 0.86 CM (ref 0.6–1.1)
IVRT: 91.17 MSEC
LDLC SERPL CALC-MCNC: 120.6 MG/DL (ref 63–159)
LEFT ATRIUM SIZE: 3.55 CM
LEFT INTERNAL DIMENSION IN SYSTOLE: 3.36 CM (ref 2.1–4)
LEFT VENTRICLE MASS INDEX: 78 G/M2
LEFT VENTRICULAR INTERNAL DIMENSION IN DIASTOLE: 4.55 CM (ref 3.5–6)
LEFT VENTRICULAR MASS: 127.31 G
LV LATERAL E/E' RATIO: 8.5 M/S
LV SEPTAL E/E' RATIO: 13.6 M/S
LYMPHOCYTES # BLD AUTO: 2 K/UL (ref 1–4.8)
LYMPHOCYTES NFR BLD: 33.3 % (ref 18–48)
MAGNESIUM SERPL-MCNC: 2 MG/DL (ref 1.6–2.6)
MCH RBC QN AUTO: 31 PG (ref 27–31)
MCHC RBC AUTO-ENTMCNC: 32.4 G/DL (ref 32–36)
MCV RBC AUTO: 96 FL (ref 82–98)
MONOCYTES # BLD AUTO: 0.5 K/UL (ref 0.3–1)
MONOCYTES NFR BLD: 8.1 % (ref 4–15)
MV PEAK A VEL: 0.9 M/S
MV PEAK E VEL: 0.68 M/S
MV STENOSIS PRESSURE HALF TIME: 75 MS
MV VALVE AREA P 1/2 METHOD: 2.93 CM2
NEUTROPHILS # BLD AUTO: 2.9 K/UL (ref 1.8–7.7)
NEUTROPHILS NFR BLD: 48.8 % (ref 38–73)
NONHDLC SERPL-MCNC: 135 MG/DL
NRBC BLD-RTO: 0 /100 WBC
PHOSPHATE SERPL-MCNC: 4.2 MG/DL (ref 2.7–4.5)
PISA TR MAX VEL: 2.51 M/S
PLATELET # BLD AUTO: 254 K/UL (ref 150–350)
PMV BLD AUTO: 9.9 FL (ref 9.2–12.9)
POTASSIUM SERPL-SCNC: 4.1 MMOL/L (ref 3.5–5.1)
PROT SERPL-MCNC: 6.5 G/DL (ref 6–8.4)
PROTHROMBIN TIME: 12.4 SEC (ref 10.6–14.8)
PROX AORTA: 4 CM
PV PEAK VELOCITY: 65.51 CM/S
RA PRESSURE: 3 MMHG
RBC # BLD AUTO: 4.2 M/UL (ref 4–5.4)
RIGHT VENTRICULAR END-DIASTOLIC DIMENSION: 209 CM
SODIUM SERPL-SCNC: 141 MMOL/L (ref 136–145)
TDI LATERAL: 0.08 M/S
TDI SEPTAL: 0.05 M/S
TDI: 0.07 M/S
TR MAX PG: 25 MMHG
TRICUSPID ANNULAR PLANE SYSTOLIC EXCURSION: 2.3 CM
TRIGL SERPL-MCNC: 72 MG/DL (ref 30–150)
TROPONIN I SERPL DL<=0.01 NG/ML-MCNC: <0.03 NG/ML (ref 0.02–0.04)
TV REST PULMONARY ARTERY PRESSURE: 28 MMHG
WBC # BLD AUTO: 5.91 K/UL (ref 3.9–12.7)

## 2019-10-29 PROCEDURE — 97161 PT EVAL LOW COMPLEX 20 MIN: CPT

## 2019-10-29 PROCEDURE — G0378 HOSPITAL OBSERVATION PER HR: HCPCS

## 2019-10-29 PROCEDURE — 36415 COLL VENOUS BLD VENIPUNCTURE: CPT

## 2019-10-29 PROCEDURE — 80061 LIPID PANEL: CPT

## 2019-10-29 PROCEDURE — 83036 HEMOGLOBIN GLYCOSYLATED A1C: CPT

## 2019-10-29 PROCEDURE — 82553 CREATINE MB FRACTION: CPT

## 2019-10-29 PROCEDURE — 80053 COMPREHEN METABOLIC PANEL: CPT

## 2019-10-29 PROCEDURE — 84484 ASSAY OF TROPONIN QUANT: CPT

## 2019-10-29 PROCEDURE — 85730 THROMBOPLASTIN TIME PARTIAL: CPT

## 2019-10-29 PROCEDURE — 83735 ASSAY OF MAGNESIUM: CPT

## 2019-10-29 PROCEDURE — 85025 COMPLETE CBC W/AUTO DIFF WBC: CPT

## 2019-10-29 PROCEDURE — 84100 ASSAY OF PHOSPHORUS: CPT

## 2019-10-29 PROCEDURE — 85610 PROTHROMBIN TIME: CPT

## 2019-10-29 RX ORDER — ASPIRIN 81 MG/1
81 TABLET ORAL DAILY
Refills: 0 | COMMUNITY
Start: 2019-10-29 | End: 2020-10-28

## 2019-10-29 RX ORDER — ATORVASTATIN CALCIUM 20 MG/1
20 TABLET, FILM COATED ORAL DAILY
Qty: 90 TABLET | Refills: 3 | Status: SHIPPED | OUTPATIENT
Start: 2019-10-29 | End: 2020-10-28

## 2019-10-29 NOTE — PLAN OF CARE
Neuro checks as ordered, no deficits noted except for mild dizziness upon standing. VS stable  Problem: Adult Inpatient Plan of Care  Goal: Plan of Care Review  Outcome: Ongoing, Progressing     Problem: Adult Inpatient Plan of Care  Goal: Patient-Specific Goal (Individualization)  Outcome: Ongoing, Progressing     Problem: Adjustment to Illness (Stroke, Ischemic/Transient Ischemic Attack)  Goal: Optimal Coping  Outcome: Ongoing, Progressing     Problem: Functional Ability Impaired (Stroke, Ischemic/Transient Ischemic Attack)  Goal: Optimal Functional Ability  Outcome: Ongoing, Progressing     Problem: Hemodynamic Instability (Stroke, Ischemic/Transient Ischemic Attack)  Goal: Vital Signs Remain in Desired Range  Outcome: Ongoing, Progressing

## 2019-10-29 NOTE — PROGRESS NOTES
[facility]  Neurology  Progress Note    Patient Name: Laura Patino  MRN: [id number]  Admission Date: 10/28/2019  Hospital Length of Stay: 0 days  Code Status: Full Code   Attending Provider: Ravi Peña MD  Primary Care Physician: Tanika Maynard MD   Principal Problem:TIA (transient ischemic attack)    Subjective:     Interval History: Pt seen and examined with her  at bedside. She states that she has been having intermittent left arm numbness for approximately three weeks but yesterday began to experience left face numbness. She states that the dizziness is described as a feeling of being off balance (worse on standing) which got much worse yesterday. She does have an unsteady gait on exam. She denies tinnitus, nausea, or vomiting. She denies current decreased sensation. Results of testing reviewed.     HPI: This is a 64 year old female with PMH of tobacco use and drinks e beers a day who presents with new onset face numbness associated with dizziness x3 days with progressive worsening. The dizziness is reported to be worse when she turns her head or gets up. 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 When the dizziness became worse, she contacted her PCP and was told to come to the ER.     CRT: 0.6  LDL: 120.6  A1c: 5.6    Brain imaging:   CT head:   No acute intracranial process    MRI brain:   1.  No finding to suggest an acute infarct or intracranial bleed.    2.  Mild paranasal sinus disease.    Neck Imaging:  CTA head and neck:   1. Mild atheromatous plaque of the left carotid bulb and both ICA origins, with no significant carotid arterial stenosis or vascular occlusion.  2. Widely patent vertebral arteries.  3. No significant abnormality of the intracranial arterial vasculature.    Cardiac imaging: pending     Current Neurological Medications: MAR reviewed     Current Facility-Administered Medications   Medication Dose Route  Frequency Provider Last Rate Last Dose    aspirin EC tablet 81 mg  81 mg Oral Daily Celeste Lindsey MD        atorvastatin tablet 40 mg  40 mg Oral Daily Celeste Lindsey MD        cetirizine tablet 10 mg  10 mg Oral Daily Celeste Lindsey MD        cyanocobalamin tablet 100 mcg  100 mcg Oral Daily Celeste Lindsey MD        enoxaparin injection 40 mg  40 mg Subcutaneous Daily Celeste Lindsey MD   40 mg at 10/28/19 1903    hydrALAZINE injection 10 mg  10 mg Intravenous Q4H PRN Celeste Lindsey MD        ondansetron injection 4 mg  4 mg Intravenous Q8H PRN Celeste Lindsey MD        polyethylene glycol packet 17 g  17 g Oral BID PRN Celeste Lindsey MD        sodium chloride 0.9% flush 10 mL  10 mL Intravenous PRN Celeste Lindsey MD           Review of Systems   Constitutional: Negative.    HENT: Negative.    Eyes: Negative.    Respiratory: Negative.    Cardiovascular: Negative.    Gastrointestinal: Negative.    Endocrine: Negative.    Genitourinary: Negative.    Musculoskeletal: Negative.    Skin: Negative.    Neurological: Positive for dizziness and numbness.   Hematological: Negative.    Psychiatric/Behavioral: Negative.      Objective:     Vital Signs (Most Recent):  Temp: 97.7 °F (36.5 °C) (10/29/19 0824)  Pulse: 67 (10/29/19 0824)  Resp: 20 (10/29/19 0824)  BP: 129/77 (10/29/19 0824)  SpO2: 96 % (10/29/19 0824) Vital Signs (24h Range):  Temp:  [97.7 °F (36.5 °C)-98.5 °F (36.9 °C)] 97.7 °F (36.5 °C)  Pulse:  [63-74] 67  Resp:  [15-29] 20  SpO2:  [95 %-100 %] 96 %  BP: (100-182)/(59-94) 129/77     Weight: 65.2 kg (143 lb 13.6 oz)  Body mass index is 26.31 kg/m².    Physical Exam   Constitutional: She is oriented to person, place, and time. She appears well-developed and well-nourished.   HENT:   Head: Normocephalic and atraumatic.   Eyes: Pupils are equal, round, and reactive to light. EOM are normal.   Neck: Normal range of motion. Neck supple.   Cardiovascular: Normal rate and regular rhythm.    Pulmonary/Chest: Effort normal and breath sounds normal.   Abdominal: Soft. Bowel sounds are normal.   Musculoskeletal: Normal range of motion.   Neurological: She is alert and oriented to person, place, and time. She has normal strength and normal reflexes. No sensory deficit. She has a normal Finger-Nose-Finger Test and a normal Heel to Son Test.   Skin: Skin is warm and dry.   Psychiatric: She has a normal mood and affect.   Nursing note and vitals reviewed.      NEUROLOGICAL EXAMINATION:     MENTAL STATUS   Oriented to person, place, and time.   Level of consciousness: alert    CRANIAL NERVES   Cranial nerves II through XII intact.     CN III, IV, VI   Pupils are equal, round, and reactive to light.  Extraocular motions are normal.     MOTOR EXAM     Strength   Strength 5/5 throughout.     SENSORY EXAM   Light touch normal.     GAIT AND COORDINATION      Coordination   Finger to nose coordination: normal  Heel to shin coordination: normal    Tremor   Resting tremor: absent  Intention tremor: absent       Unsteady gait, unable to walk on heels       Significant Labs: All pertinent lab results from the past 24 hours have been reviewed.    Significant Imaging: I have reviewed and interpreted all pertinent imaging results/findings within the past 24 hours.    Assessment and Plan:   1. Disequilibrium   -MRI brain and CT head negative for acute process  -F/u TTE with bubble  -CTA head and neck showed no significant carotid artery stenosis but does show mild atheromatous plaque of the left carotid bulb and both ICA origins  -PT/OT eval and treat      2. Paresthesia  - Intermittent left UE and facial numnbess x3 weeks  - Will treat as TIA due to vascular risk factors including elevated LDL and plaque on carotid arteries  - Recommend daily ASA 81mg and Statin for stroke prevention   -F/u on TTE with bubble     Stroke education was provided.  If patient has acute neurological changes including weakness, confusion,  speech changes, facial droop, difficulty walking, and sensory changes immediately call 911.  Follow up Neurology in 2 weeks at [phone].  Medication side effects discussed with the patient and/or caregiver.    Active Diagnoses:    Diagnosis Date Noted POA    PRINCIPAL PROBLEM:  TIA (transient ischemic attack) [G45.9] 10/28/2019 Yes    Alcohol use [Z72.89] 10/28/2019 Yes      Problems Resolved During this Admission:       VTE Risk Mitigation (From admission, onward)         Ordered     enoxaparin injection 40 mg  Daily      10/28/19 9900                THU You  Neurology  [facility]

## 2019-10-29 NOTE — PT/OT/SLP EVAL
"Physical Therapy Evaluation and Discharge Note    Patient Name:  Laura Patino   MRN:  [id number]    Recommendations:     Discharge Recommendations:  home   Discharge Equipment Recommendations: none   Barriers to discharge: None    Assessment:     Laura Patino is a 64 y.o. female admitted with a medical diagnosis of TIA (transient ischemic attack). .  At this time, patient is functioning at their prior level of function and does not require further acute PT services.  present for all instructions.    Recent Surgery: * No surgery found *      Plan:     During this hospitalization, patient does not require further acute PT services.  Please re-consult if situation changes.      Subjective     Chief Complaint: "I'm still a dizzy but better than yesterday"  Patient/Family Comments/goals: to go home  Pain/Comfort:  · Pain Rating 1: 0/10    Patients cultural, spiritual, Jew conflicts given the current situation:      Living Environment:  Pt lives with .   Prior to admission, patients level of function was INDependent.  Equipment used at home: none.  DME owned (not currently used): unknown.  Upon discharge, patient will have assistance from .    Objective:     Communicated with rn prior to session.  Patient found sitting edge of bed with telemetry upon PT entry to room.    General Precautions: Standard, fall   Orthopedic Precautions:    Braces:       Exams:  · Cognitive Exam:  Patient is oriented to Person, Place, Time and Situation  · Gross Motor Coordination:  WFL  · RUE Strength: WNL  · LUE Strength: WNL  · RLE ROM: WNL  · RLE Strength: WNL  · LLE ROM: WNL  · LLE Strength: WNL    Functional Mobility:  · Transfers:     · Sit to Stand:  independence with no AD  · Gait: pt able to amb 180 ft no assistive device, no lob, no change in dizziness, able to walk and talk and look left and right.   · Balance: performed high level balance ex heelraises, toe raises, turn in [place], side step, " braiding. with occasionally pt needing to hold wall.     AM-PAC 6 CLICK MOBILITY  Total Score:24       Therapeutic Activities and Exercises:   education, fall prevention, poc,dc planning    AM-PAC 6 CLICK MOBILITY  Total Score:24     Patient left sitting edge of bed.  with rn notified and   present.    GOALS:   Multidisciplinary Problems     Physical Therapy Goals     Not on file                History:     Past Medical History:   Diagnosis Date    Depression        History reviewed. No pertinent surgical history.    Time Tracking:     PT Received On: 10/29/19  PT Start Time: 0935     PT Stop Time: 0947  PT Total Time (min): 12 min     Billable Minutes: Evaluation 12      Padmini Akhtar, PT  10/29/2019

## 2019-10-29 NOTE — NURSING
Discharge instructions given to patient and spouse. Patient and spouse verbalized understanding of follow up appointments to be made and new medications. Patient to discharge home via private vehicle.

## 2019-10-29 NOTE — DISCHARGE SUMMARY
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 The patient has been seen by Neurology and has been cleared for discharge with outpatient follow-up by Neuro and ENT  VSS  Bilateral ears: no otitis  Neuro: CN 2-12 grossly intact, moves all extremities spontaneously against gravity     Admitting history states alcohol use. Patient does NOT have history of alcohol abuse.    Consults:   Consults (From admission, onward)        Status Ordering Provider     Inpatient consult to Hospitalist  Once     Provider:  Celeste Lindsey MD    Acknowledged CELESTE LINDSEY     Inpatient consult to Neurology  Once     Provider:  Sae Lawson MD    Completed CELESTE LINDSEY     Inpatient consult to Registered Dietitian/Nutritionist  Once      Provider:  (Not yet assigned)    Acknowledged ROLLY MITCHELL     IP consult to case management/social work  Once     Provider:  (Not yet assigned)    Completed ROLLY MITCHELL          No new Assessment & Plan notes have been filed under this hospital service since the last note was generated.  Service: Hospital Medicine    Final Active Diagnoses:      Problems Resolved During this Admission:    Diagnosis Date Noted Date Resolved POA    PRINCIPAL PROBLEM:  TIA (transient ischemic attack) [G45.9] 10/28/2019 10/29/2019 Yes    Alcohol use [Z72.89] 10/28/2019 10/29/2019 Yes       Discharged Condition: good    Disposition: Home or Self Care    Follow Up:  Follow-up Information     Topher Flannery MD In 1 week.    Specialty:  Otolaryngology  Contact information:  [facility] [address] [address] [phone]             Sae Lawson MD In 1 week.    Specialty:  Neurology  Contact information:  [address] [facility] [address] [phone]                 Patient Instructions:      Ambulatory referral to ENT   Referral Priority: Routine Referral Type: Consultation   Referral Reason: Specialty Services Required   Referred to Provider: TOPHER FLANNERY Requested Specialty: Otolaryngology   Number of Visits Requested: 1     Diet Adult Regular     Activity as tolerated       Significant Diagnostic Studies: Labs:   CMP   Recent Labs   Lab 10/28/19  1135 10/29/19  0446    141   K 4.0 4.1    107   CO2 28 26   GLU 86 97   BUN 16 21   CREATININE 0.7 0.6   CALCIUM 9.3 9.1   PROT 7.1 6.5   ALBUMIN 4.4 4.0   BILITOT 0.7 0.6   ALKPHOS 50* 44*   AST 22 24   ALT 19 22   ANIONGAP 10 8   ESTGFRAFRICA >60.0 >60.0   EGFRNONAA >60.0 >60.0   , CBC   Recent Labs   Lab 10/28/19  1135 10/29/19  0446   WBC 5.45 5.91   HGB 14.1 13.0   HCT 43.4 40.1    254    and Lipid Panel   Lab Results   Component Value Date    CHOL 203 (H) 10/29/2019    HDL 68 10/29/2019    LDLCALC 120.6 10/29/2019    TRIG 72  10/29/2019    CHOLHDL 33.5 10/29/2019       Pending Diagnostic Studies:     Procedure Component Value Units Date/Time    Echo Color Flow Doppler? Yes; Bubble Contrast? Yes [[id number]] Resulted:  10/28/19 1523    Order Status:  Sent Lab Status:  In process Updated:  10/28/19 1525     BSA 1.67 m2      TDI SEPTAL 0.05 m/s      LV LATERAL E/E' RATIO 8.50 m/s      LV SEPTAL E/E' RATIO 13.60 m/s      AORTIC VALVE CUSP SEPERATION 2.08 cm      TDI LATERAL 0.08 m/s      PV PEAK VELOCITY 65.51 cm/s      LVIDD 4.55 cm      IVS 0.86 cm      PW 0.86 cm      Ao root annulus 3.45 cm      LVIDS 3.36 cm      FS 26 %      LV mass 127.31 g      LA size 3.55 cm      RVDD 209.00 cm      Left Ventricle Relative Wall Thickness 0.38 cm      AV mean gradient 3 mmHg      AV valve area 3.08 cm2      AV Velocity Ratio 82.18     AV index (prosthetic) 0.87     E/A ratio 0.76     Mean e' 0.07 m/s      E wave decelartion time 240.35 msec      IVRT 91.17 msec      LVOT diameter 2.12 cm      LVOT area 3.5 cm2      LVOT peak brandon 95.33 m/s      LVOT peak VTI 21.68 cm      Ao peak brandon 1.16 m/s      Ao VTI 24.87 cm      LVOT stroke volume 76.49 cm3      AV peak gradient 5 mmHg      E/E' ratio 10.46 m/s      MV Peak E Brandon 0.68 m/s      TR Max Brandon 2.51 m/s      MV Peak A Brandon 0.90 m/s      LV Mass Index 78 g/m2      Triscuspid Valve Regurgitation Peak Gradient 25 mmHg     Urinalysis, Reflex to Urine Culture Urine, Clean Catch [[id number]] Collected:  10/28/19 1600    Order Status:  Sent Lab Status:  In process Updated:  10/28/19 1955    Specimen:  Urine     Vitamin B1 [[id number]] Collected:  10/28/19 1620    Order Status:  Sent Lab Status:  In process Updated:  10/28/19 1631    Specimen:  Blood          Medications:  Reconciled Home Medications:      Medication List      START taking these medications    aspirin 81 MG EC tablet  Commonly known as:  ECOTRIN  Take 1 tablet (81 mg total) by mouth once daily.     atorvastatin 20 MG tablet  Commonly known as:   LIPITOR  Take 1 tablet (20 mg total) by mouth once daily.        CONTINUE taking these medications    cetirizine 10 MG tablet  Commonly known as:  ZYRTEC  Take 10 mg by mouth once daily.     cyanocobalamin 100 MCG tablet  Commonly known as:  VITAMIN B-12  Take 100 mcg by mouth once daily.            Indwelling Lines/Drains at time of discharge:   Lines/Drains/Airways     None                 Time spent on the discharge of patient: 32 minutes  Patient was seen and examined on the date of discharge and determined to be suitable for discharge.         Ravi Peña MD  Department of Hospital Medicine  Formerly [facility], later [facility]

## 2019-10-29 NOTE — CONSULTS
"[facility]  Neurology  Consult Note    Patient Name: Laura Patino  MRN: [id number]  Admission Date: 10/28/2019  Hospital Length of Stay: 0 days  Code Status: Full Code   Attending Provider: Celeste Lindsey MD   Consulting Provider: Apple Marino NP  Primary Care Physician: Tanika Maynard MD  Principal Problem:TIA (transient ischemic attack)    Inpatient consult to Neurology  Consult performed by: Apple Marino NP  Consult ordered by: Celeste Lindsey MD        Subjective:     Chief Complaint:    Patient presents with    Dizziness       FEELING OFF BALANCE , SINCE FRIDAY    ARM TINGLING       LEFT X 3 WEEKS          HPI:  63 yo CF with PMH of tobacco use presents with face numbness.  Onset this AM, stable progression   Located L face, mandibular and maxilla region, with no radiation  Never happened before  Also with dizziness x 3 days, progressively worsening  Worse when she turns her head or gets up  Also with L arm tingling and numbness x 3 weeks  She works in a cafeteria lifting heavy boxes so she didn't think anything of the arm symptoms  After worsening dizziness, she contacted her PCP  The triage nurse told her to present to ED  Pt without hx of DM, HTN  Only takes zyrtec at home  No asa, no statin, no BP meds  Drinks 3 beers a day  No dysarthria, no dysphagia  No headache, no vision changes  No CP, no SOB  No nausea  No weakness       Neurology Interval History: Patient seen and examined. She states Friday night she began having some left arm numbness and tingling. Then, on Saturday morning, she felt very "off balance" and dizzy. This resolved around noon. However, the dizziness and gait disturbance returned on Sunday morning. It again resolved around noon.This morning the dizziness and gait disturbance returned and has not yet resolved. She states every time she stands up she feels very dizzy like she could fall down. She does still have some numbness and tingling in her left arm and " decreased sensation on the left side of her face. Her gait is abnormal and she looses her balance very easily. She cannot walk in a straight line,-she leans to the right. She denies any further complaints. No headache, facial droop, slurred speech, LOC, or unilateral extremity weakness. NIHSS of 0.    Past Medical History:   Diagnosis Date    Depression        History reviewed. No pertinent surgical history.    Review of patient's allergies indicates:  No Known Allergies    Current Neurological Medications:     No current facility-administered medications on file prior to encounter.      Current Outpatient Medications on File Prior to Encounter   Medication Sig    cetirizine (ZYRTEC) 10 MG tablet Take 10 mg by mouth once daily.    cyanocobalamin (VITAMIN B-12) 100 MCG tablet Take 100 mcg by mouth once daily.    [DISCONTINUED] escitalopram oxalate (LEXAPRO) 10 MG tablet Take 1 tablet (10 mg total) by mouth once daily.    [DISCONTINUED] vitamin D (VITAMIN D3) 1000 units Tab Take 1,000 Units by mouth once daily.      Family History     Problem Relation (Age of Onset)    Hypertension Father        Tobacco Use    Smoking status: Current Some Day Smoker    Smokeless tobacco: Never Used   Substance and Sexual Activity    Alcohol use: Yes     Alcohol/week: 21.0 standard drinks     Types: 21 Cans of beer per week    Drug use: No    Sexual activity: Not on file     Review of Systems   Constitutional: Negative for activity change, chills, diaphoresis, fatigue and fever.   HENT: Negative for hearing loss, sneezing, tinnitus, trouble swallowing and voice change.    Eyes: Negative for photophobia and visual disturbance.   Respiratory: Negative.    Cardiovascular: Negative.    Gastrointestinal: Negative.    Genitourinary: Negative.    Musculoskeletal: Positive for gait problem. Negative for myalgias, neck pain and neck stiffness.   Skin: Negative.    Neurological: Positive for dizziness, light-headedness and numbness  (left side of face and left arm). Negative for tremors, seizures, syncope, facial asymmetry, speech difficulty, weakness and headaches.   Hematological: Negative.    Psychiatric/Behavioral: Negative for confusion, hallucinations and sleep disturbance. The patient is not nervous/anxious.      Objective:     Vital Signs (Most Recent):  Temp: 97.9 °F (36.6 °C) (10/28/19 1958)  Pulse: 70 (10/28/19 2100)  Resp: 18 (10/28/19 2100)  BP: 132/76 (10/28/19 1958)  SpO2: 98 % (10/28/19 2100) Vital Signs (24h Range):  Temp:  [97.8 °F (36.6 °C)-98.3 °F (36.8 °C)] 97.9 °F (36.6 °C)  Pulse:  [63-74] 70  Resp:  [15-29] 18  SpO2:  [95 %-100 %] 98 %  BP: (125-182)/(59-94) 132/76     Weight: 66 kg (145 lb 8 oz)  Body mass index is 26.61 kg/m².    Physical Exam   Constitutional: She is oriented to person, place, and time. She appears well-developed and well-nourished.   HENT:   Head: Normocephalic and atraumatic.   Eyes: Pupils are equal, round, and reactive to light. EOM are normal.   Neck: Normal range of motion.   Cardiovascular: Normal rate.   Pulmonary/Chest: Effort normal.   Abdominal: Soft. She exhibits no distension.   Musculoskeletal: Normal range of motion.   Neurological: She is alert and oriented to person, place, and time. She displays normal reflexes. No cranial nerve deficit or sensory deficit. She exhibits normal muscle tone. Coordination normal.   Reflex Scores:       Tricep reflexes are 2+ on the right side and 2+ on the left side.       Bicep reflexes are 2+ on the right side and 2+ on the left side.       Brachioradialis reflexes are 2+ on the right side and 2+ on the left side.       Patellar reflexes are 2+ on the right side and 2+ on the left side.       Achilles reflexes are 2+ on the right side and 2+ on the left side.  Skin: Skin is warm and dry. Capillary refill takes less than 2 seconds.   Psychiatric: She has a normal mood and affect. Her speech is normal.       NEUROLOGICAL EXAMINATION:     MENTAL STATUS    Oriented to person, place, and time.   Follows 3 step commands.   Attention: normal. Concentration: normal.   Speech: speech is normal   Level of consciousness: alert  Knowledge: good.     CRANIAL NERVES   Cranial nerves II through XII intact.     CN III, IV, VI   Pupils are equal, round, and reactive to light.  Extraocular motions are normal.     CN V   Left facial sensation deficit: cheeks    MOTOR EXAM   Muscle bulk: normal  Overall muscle tone: normal  Right arm tone: normal  Left arm tone: normal    Strength   Right neck flexion: 5/5  Left neck flexion: 5/5  Right neck extension: 5/5  Left neck extension: 5/5  Right deltoid: 5/5  Left deltoid: 5/5  Right biceps: 5/5  Left biceps: 5/5  Right triceps: 5/5  Left triceps: 5/5  Right wrist flexion: 5/5  Left wrist flexion: 5/5  Right wrist extension: 5/5  Left wrist extension: 5/5  Right interossei: 5/5  Left interossei: 5/5  Right abdominals: 5/5  Left abdominals: 5/5  Right iliopsoas: 5/5  Left iliopsoas: 5/5  Right quadriceps: 5/5  Left quadriceps: 5/5  Right hamstrin/5  Left hamstrin/5  Right glutei: 5/5  Left glutei: 5/5  Right anterior tibial: 5/5  Left anterior tibial: 5/5  Right posterior tibial: 5/5  Left posterior tibial: 5/5  Right peroneal: 5/5  Left peroneal: 5/5  Right gastroc: 5/5  Left gastroc: 5/5    REFLEXES     Reflexes   Right brachioradialis: 2+  Left brachioradialis: 2+  Right biceps: 2+  Left biceps: 2+  Right triceps: 2+  Left triceps: 2+  Right patellar: 2+  Left patellar: 2+  Right achilles: 2+  Left achilles: 2+  Right plantar: normal  Left plantar: normal    SENSORY EXAM   Right arm light touch: normal  Left arm light touch: decreased from shoulder.    GAIT AND COORDINATION     Gait  Gait: (disequilibrium)    Tremor   Resting tremor: absent  Action tremor: absent      Significant Labs:   Lab Results   Component Value Date    CREATININE 0.7 10/28/2019     Lab Results   Component Value Date    TSH 1.500 10/28/2019        Significant Imaging:      Ct Head Without Contrast    Result Date: 10/28/2019  No acute intracranial process     Mri Brain Without Contrast    Result Date: 10/28/2019  1.  No finding to suggest an acute infarct or intracranial bleed. 2.  Mild paranasal sinus disease.      Cta Head And Neck (xpd)    Result Date: 10/28/2019  1. Mild atheromatous plaque of the left carotid bulb and both ICA origins, with no significant carotid arterial stenosis or vascular occlusion. 2. Widely patent vertebral arteries. 3. No significant abnormality of the intracranial arterial vasculature.     TTE pending    Assessment and Plan:    1. Disequilibrium   -MRI brain and CT head negative for acute process  -F/u TTE with bubble  -CTA head and neck showed no significant carotid artery stenosis  -PT/OT    2. Paresthesia  -Has improved, but still present on left side of face and left arm  -F/u on TTE with bubble      [phone] within 2 weeks from discharge.  Stroke education was provided including stroke risk factors modification and any acute neurological changes including weakness, confusion, visual changes to come straight to the ER.               Active Diagnoses:    Diagnosis Date Noted POA    PRINCIPAL PROBLEM:  TIA (transient ischemic attack) [G45.9] 10/28/2019 Yes    Alcohol use [Z72.89] 10/28/2019 Yes      Problems Resolved During this Admission:       VTE Risk Mitigation (From admission, onward)         Ordered     enoxaparin injection 40 mg  Daily      10/28/19 1802              Plan of care discussed with Dr. Lawson.    Thank you for your consult. I will follow-up with patient. Please contact us if you have any additional questions.    Apple Marino NP  Neurology  [facility] hospital

## 2019-10-29 NOTE — HOSPITAL COURSE
10/29: Labs personally reviewed: CBC, and CMP accdeptable; Lipids show hypercholesterolemia; MRI = no acute findings. The patient has been seen by Neurology and has been cleared for discharge with outpatient follow-up by Neuro and ENT  VSS  Bilateral ears: no otitis  Neuro: CN 2-12 grossly intact, moves all extremities spontaneously against gravity

## 2019-11-01 LAB — VIT B1 BLD-SCNC: 164.2 NMOL/L (ref 66.5–200)

## 2020-03-09 ENCOUNTER — TELEPHONE (OUTPATIENT)
Dept: OPTOMETRY | Facility: CLINIC | Age: 65
End: 2020-03-09

## 2020-03-09 NOTE — TELEPHONE ENCOUNTER
Appointment Request From: Laura Patino      With Provider: Leonardo Stephen, OD [[place] [facility] - Optometry]      Preferred Date Range: 3/9/2020 - 3/13/2020      Preferred Times: Monday Afternoon, Tuesday Afternoon, Wednesday Afternoon, Thursday Afternoon, Friday Afternoon      Reason for visit: Frames broke, need prescription checked before replacing      Comments:   Need to replace frames and maybe lenses. Need annual exam also. Last full exam was 4/18/2018. Apptmt time after 2:30PM, any day.     Spoke with pt about Dr. Stephen being on maternity leave, I notified pt that I could get her in sooner in [place] or see what dates are available with a different provider in [place]. Pt asked to be seen in [place] with a different provider after 2:30. I was able to get her in with Dr. Marr for April 1st at 3 per her request. Pt did not want an appointment for a CL fit exam. Pt stated she hardly wears her lenses and would just like a new Rx for glasses.

## 2020-03-13 DIAGNOSIS — Z12.11 COLON CANCER SCREENING: ICD-10-CM

## 2020-07-16 ENCOUNTER — LAB VISIT (OUTPATIENT)
Dept: PRIMARY CARE CLINIC | Facility: OTHER | Age: 65
End: 2020-07-16
Attending: INTERNAL MEDICINE
Payer: COMMERCIAL

## 2020-07-16 DIAGNOSIS — Z03.818 ENCOUNTER FOR OBSERVATION FOR SUSPECTED EXPOSURE TO OTHER BIOLOGICAL AGENTS RULED OUT: ICD-10-CM

## 2020-07-16 PROCEDURE — U0003 INFECTIOUS AGENT DETECTION BY NUCLEIC ACID (DNA OR RNA); SEVERE ACUTE RESPIRATORY SYNDROME CORONAVIRUS 2 (SARS-COV-2) (CORONAVIRUS DISEASE [COVID-19]), AMPLIFIED PROBE TECHNIQUE, MAKING USE OF HIGH THROUGHPUT TECHNOLOGIES AS DESCRIBED BY CMS-2020-01-R: HCPCS

## 2020-07-21 LAB — SARS-COV-2 RNA RESP QL NAA+PROBE: NEGATIVE

## 2020-07-28 ENCOUNTER — PATIENT OUTREACH (OUTPATIENT)
Dept: ADMINISTRATIVE | Facility: HOSPITAL | Age: 65
End: 2020-07-28

## 2020-07-31 ENCOUNTER — PATIENT OUTREACH (OUTPATIENT)
Dept: ADMINISTRATIVE | Facility: HOSPITAL | Age: 65
End: 2020-07-31

## 2020-07-31 NOTE — PROGRESS NOTES
Chart review completed 2020.  Care Everywhere updates requested and reviewed.  Immunizations reconciled. Media reports reviewed.  Duplicate HM overrides and  orders removed.  Overdue HM topic chart audit and/or requested.  Overdue lab testing linked to upcoming lab appointments if applies.    PORTAL MESSAGE SENT    Health Maintenance Due   Topic Date Due    HIV Screening  10/17/1970    Colorectal Cancer Screening  10/17/2007

## 2020-09-17 ENCOUNTER — PATIENT OUTREACH (OUTPATIENT)
Dept: ADMINISTRATIVE | Facility: HOSPITAL | Age: 65
End: 2020-09-17

## 2020-09-17 NOTE — PROGRESS NOTES
Chart review completed 2020.  Care Everywhere updates requested and reviewed.  Immunizations reconciled. Media reports reviewed.  Duplicate HM overrides and  orders removed.  Overdue HM topic chart audit and/or requested.  Overdue lab testing linked to upcoming lab appointments if applies.    See previous chart review, pt rescheduled    Health Maintenance Due   Topic Date Due    HIV Screening  10/17/1970    Colorectal Cancer Screening  10/17/2007    Influenza Vaccine (1) 2020

## 2020-10-05 ENCOUNTER — PATIENT MESSAGE (OUTPATIENT)
Dept: ADMINISTRATIVE | Facility: HOSPITAL | Age: 65
End: 2020-10-05

## 2021-01-04 ENCOUNTER — PATIENT MESSAGE (OUTPATIENT)
Dept: ADMINISTRATIVE | Facility: HOSPITAL | Age: 66
End: 2021-01-04

## 2021-01-07 DIAGNOSIS — Z12.31 OTHER SCREENING MAMMOGRAM: ICD-10-CM

## 2021-03-17 DIAGNOSIS — Z12.11 COLON CANCER SCREENING: ICD-10-CM

## 2021-03-31 ENCOUNTER — IMMUNIZATION (OUTPATIENT)
Dept: PRIMARY CARE CLINIC | Facility: CLINIC | Age: 66
End: 2021-03-31

## 2021-03-31 DIAGNOSIS — Z23 NEED FOR VACCINATION: Primary | ICD-10-CM

## 2021-03-31 PROCEDURE — 91303 PR SARSCOV2 VAC AD26 .5ML IM: CPT | Mod: S$GLB,,, | Performed by: INTERNAL MEDICINE

## 2021-03-31 PROCEDURE — 0031A PR IMMUNIZ ADMIN, SARS-COV-2 COVID-19 VACC, 5X10VP/0.5ML: ICD-10-PCS | Mod: CV19,S$GLB,, | Performed by: INTERNAL MEDICINE

## 2021-03-31 PROCEDURE — 91303 PR SARSCOV2 VAC AD26 .5ML IM: ICD-10-PCS | Mod: S$GLB,,, | Performed by: INTERNAL MEDICINE

## 2021-03-31 PROCEDURE — 0031A PR IMMUNIZ ADMIN, SARS-COV-2 COVID-19 VACC, 5X10VP/0.5ML: CPT | Mod: CV19,S$GLB,, | Performed by: INTERNAL MEDICINE

## 2021-04-05 ENCOUNTER — PATIENT MESSAGE (OUTPATIENT)
Dept: ADMINISTRATIVE | Facility: HOSPITAL | Age: 66
End: 2021-04-05

## 2021-07-06 ENCOUNTER — PATIENT MESSAGE (OUTPATIENT)
Dept: ADMINISTRATIVE | Facility: HOSPITAL | Age: 66
End: 2021-07-06

## 2021-07-07 ENCOUNTER — PATIENT MESSAGE (OUTPATIENT)
Dept: ADMINISTRATIVE | Facility: HOSPITAL | Age: 66
End: 2021-07-07

## 2021-10-07 ENCOUNTER — PATIENT MESSAGE (OUTPATIENT)
Dept: ADMINISTRATIVE | Facility: HOSPITAL | Age: 66
End: 2021-10-07

## 2022-05-31 ENCOUNTER — PATIENT MESSAGE (OUTPATIENT)
Dept: ADMINISTRATIVE | Facility: HOSPITAL | Age: 67
End: 2022-05-31
Payer: COMMERCIAL